# Patient Record
Sex: MALE | Race: WHITE | Employment: FULL TIME | URBAN - METROPOLITAN AREA
[De-identification: names, ages, dates, MRNs, and addresses within clinical notes are randomized per-mention and may not be internally consistent; named-entity substitution may affect disease eponyms.]

---

## 2020-11-13 ENCOUNTER — OFFICE VISIT (OUTPATIENT)
Dept: VASCULAR SURGERY | Age: 55
End: 2020-11-13
Payer: OTHER GOVERNMENT

## 2020-11-13 ENCOUNTER — TELEPHONE (OUTPATIENT)
Dept: VASCULAR SURGERY | Age: 55
End: 2020-11-13

## 2020-11-13 VITALS
DIASTOLIC BLOOD PRESSURE: 102 MMHG | BODY MASS INDEX: 29.77 KG/M2 | SYSTOLIC BLOOD PRESSURE: 150 MMHG | HEIGHT: 69 IN | WEIGHT: 201 LBS

## 2020-11-13 PROBLEM — I65.22 LEFT CAROTID STENOSIS: Status: ACTIVE | Noted: 2020-11-13

## 2020-11-13 PROBLEM — Z98.890 HISTORY OF RIGHT COMMON CAROTID ARTERY STENT PLACEMENT: Status: ACTIVE | Noted: 2020-11-13

## 2020-11-13 PROBLEM — Z95.828 HISTORY OF RIGHT COMMON CAROTID ARTERY STENT PLACEMENT: Status: ACTIVE | Noted: 2020-11-13

## 2020-11-13 PROCEDURE — 99244 OFF/OP CNSLTJ NEW/EST MOD 40: CPT | Performed by: SURGERY

## 2020-11-13 RX ORDER — CLOPIDOGREL BISULFATE 75 MG/1
75 TABLET ORAL DAILY
Status: ON HOLD | COMMUNITY
End: 2021-07-14 | Stop reason: HOSPADM

## 2020-11-13 RX ORDER — ASPIRIN 325 MG
325 TABLET ORAL DAILY
Status: ON HOLD | COMMUNITY
End: 2021-07-14 | Stop reason: HOSPADM

## 2020-11-13 RX ORDER — AMLODIPINE BESYLATE 10 MG/1
10 TABLET ORAL DAILY
COMMUNITY
End: 2021-11-09 | Stop reason: ALTCHOICE

## 2020-11-13 RX ORDER — LISINOPRIL 10 MG/1
10 TABLET ORAL DAILY
Status: ON HOLD | COMMUNITY
End: 2021-07-14 | Stop reason: HOSPADM

## 2020-11-13 SDOH — HEALTH STABILITY: MENTAL HEALTH: HOW OFTEN DO YOU HAVE A DRINK CONTAINING ALCOHOL?: MONTHLY OR LESS

## 2020-11-13 NOTE — TELEPHONE ENCOUNTER
Called patient regarding rescheduling apt. Still no disc.  Need to reschedule to next week in hopes can get disc.pamlr

## 2020-11-13 NOTE — PROGRESS NOTES
mg by mouth daily   Yes Historical Provider, MD        Allergies:  Penicillins      Social History:      Social History     Socioeconomic History    Marital status: Unknown     Spouse name: Not on file    Number of children: Not on file    Years of education: Not on file    Highest education level: Not on file   Occupational History    Not on file   Social Needs    Financial resource strain: Not on file    Food insecurity     Worry: Not on file     Inability: Not on file    Transportation needs     Medical: Not on file     Non-medical: Not on file   Tobacco Use    Smoking status: Current Every Day Smoker     Packs/day: 2.00     Years: 45.00     Pack years: 90.00     Types: Cigarettes    Smokeless tobacco: Never Used   Substance and Sexual Activity    Alcohol use: Yes     Frequency: Monthly or less    Drug use: Never    Sexual activity: Yes   Lifestyle    Physical activity     Days per week: Not on file     Minutes per session: Not on file    Stress: Not on file   Relationships    Social connections     Talks on phone: Not on file     Gets together: Not on file     Attends [employer]ism service: Not on file     Active member of club or organization: Not on file     Attends meetings of clubs or organizations: Not on file     Relationship status: Not on file    Intimate partner violence     Fear of current or ex partner: Not on file     Emotionally abused: Not on file     Physically abused: Not on file     Forced sexual activity: Not on file   Other Topics Concern    Not on file   Social History Narrative    Not on file       Family History:    History reviewed. No pertinent family history. Review of Systems:  A 14 point review of systems was completed. Pertinent positives identified in the HPI, all other review of systems negative.       Physical Examination:    BP (!) 150/102 (Site: Right Upper Arm)   Ht 5' 9\" (1.753 m)   Wt 201 lb (91.2 kg)   BMI 29.68 kg/m²     Weight: 201 lb (91.2 kg) General appearance: NAD  Eyes: PERRLA  Neck: no JVD, no lymphadenopathy. Respiratory: effort is unlabored, no crackles, wheezes or rubs. Cardiovascular: regular, no murmur. Left carotid bruits. No edema or varicosities. Pulses:    carotid brachial radial   RIGHT 2 2 2   LEFT 2 - -   GI: abdomen soft, nondistended, no organomegaly. Musculoskeletal: strength and tone normal.  Extremities: warm and pink. Skin: no dermatitis or ulceration. Neuro/psychiatric: grossly intact. MEDICAL DECISION MAKING/TESTING        CTA:  Personally reviewed. High grade Left Carotid stenosis. Patent R Carotid stent without stenosis. Carotid Bifurcation does not appear excessively high and stenosis/plaque rather focal            Assessment:      Diagnosis Orders   1. Left carotid stenosis     2. History of right common carotid artery stent placement     3. Left Subclavian stenosis/occlusion      Recommendations/Plan:  I have recommended Carotid endarterectomy as it is associated with less celio-procedural stroke risk and much less risk of recurrent stenosis. The risks of surgery have been explained including but not limited to stroke, TIA, MI, bleeding, clotting, death, cranial nerve injury(vagus,marginal branch of 7th nerve,hypoglossal )with their clinical appearance, hematoma, seroma, infection. Post operative expectations for hospitalization and recovery barring any complication discussed. An educational booklet on carotid surgery was provided.       Dl Huitron MD, FACS

## 2020-11-16 ENCOUNTER — HOSPITAL ENCOUNTER (OUTPATIENT)
Age: 55
Discharge: HOME | DRG: 039 | End: 2020-11-16
Payer: OTHER GOVERNMENT

## 2020-11-16 ENCOUNTER — PREP FOR PROCEDURE (OUTPATIENT)
Dept: VASCULAR SURGERY | Age: 55
End: 2020-11-16

## 2020-11-16 ENCOUNTER — OFFICE VISIT (OUTPATIENT)
Dept: PRIMARY CARE CLINIC | Age: 55
End: 2020-11-16
Payer: OTHER GOVERNMENT

## 2020-11-16 LAB
ANION GAP SERPL CALCULATED.3IONS-SCNC: 10 MMOL/L (ref 3–16)
BASOPHILS ABSOLUTE: 0 K/UL (ref 0–0.2)
BASOPHILS RELATIVE PERCENT: 0.5 %
BUN BLDV-MCNC: 18 MG/DL (ref 7–20)
CALCIUM SERPL-MCNC: 9.7 MG/DL (ref 8.3–10.6)
CHLORIDE BLD-SCNC: 103 MMOL/L (ref 99–110)
CO2: 24 MMOL/L (ref 21–32)
CREAT SERPL-MCNC: 1.3 MG/DL (ref 0.9–1.3)
EKG ATRIAL RATE: 62 BPM
EKG DIAGNOSIS: NORMAL
EKG P AXIS: 30 DEGREES
EKG P-R INTERVAL: 146 MS
EKG Q-T INTERVAL: 422 MS
EKG QRS DURATION: 94 MS
EKG QTC CALCULATION (BAZETT): 428 MS
EKG R AXIS: 60 DEGREES
EKG T AXIS: 73 DEGREES
EKG VENTRICULAR RATE: 62 BPM
EOSINOPHILS ABSOLUTE: 0.1 K/UL (ref 0–0.6)
EOSINOPHILS RELATIVE PERCENT: 1.2 %
GFR AFRICAN AMERICAN: >60
GFR NON-AFRICAN AMERICAN: 57
GLUCOSE BLD-MCNC: 156 MG/DL (ref 70–99)
HCT VFR BLD CALC: 50.4 % (ref 40.5–52.5)
HEMOGLOBIN: 16.8 G/DL (ref 13.5–17.5)
LYMPHOCYTES ABSOLUTE: 2.4 K/UL (ref 1–5.1)
LYMPHOCYTES RELATIVE PERCENT: 31.1 %
MCH RBC QN AUTO: 30.2 PG (ref 26–34)
MCHC RBC AUTO-ENTMCNC: 33.3 G/DL (ref 31–36)
MCV RBC AUTO: 90.8 FL (ref 80–100)
MONOCYTES ABSOLUTE: 0.5 K/UL (ref 0–1.3)
MONOCYTES RELATIVE PERCENT: 6.5 %
NEUTROPHILS ABSOLUTE: 4.8 K/UL (ref 1.7–7.7)
NEUTROPHILS RELATIVE PERCENT: 60.7 %
PDW BLD-RTO: 15 % (ref 12.4–15.4)
PLATELET # BLD: 303 K/UL (ref 135–450)
PMV BLD AUTO: 8.1 FL (ref 5–10.5)
POTASSIUM SERPL-SCNC: 5.4 MMOL/L (ref 3.5–5.1)
RBC # BLD: 5.55 M/UL (ref 4.2–5.9)
SODIUM BLD-SCNC: 137 MMOL/L (ref 136–145)
WBC # BLD: 7.8 K/UL (ref 4–11)

## 2020-11-16 PROCEDURE — 85025 COMPLETE CBC W/AUTO DIFF WBC: CPT

## 2020-11-16 PROCEDURE — 80048 BASIC METABOLIC PNL TOTAL CA: CPT

## 2020-11-16 PROCEDURE — 93005 ELECTROCARDIOGRAM TRACING: CPT | Performed by: SURGERY

## 2020-11-16 PROCEDURE — 93010 ELECTROCARDIOGRAM REPORT: CPT | Performed by: INTERNAL MEDICINE

## 2020-11-16 PROCEDURE — 36415 COLL VENOUS BLD VENIPUNCTURE: CPT

## 2020-11-16 PROCEDURE — 99211 OFF/OP EST MAY X REQ PHY/QHP: CPT | Performed by: NURSE PRACTITIONER

## 2020-11-16 NOTE — PROGRESS NOTES
Royal Gillis received a viral test for COVID-19. They were educated on isolation and quarantine as appropriate. For any symptoms, they were directed to seek care from their PCP, given contact information to establish with a doctor, directed to an urgent care or the emergency room.

## 2020-11-16 NOTE — PATIENT INSTRUCTIONS

## 2020-11-16 NOTE — PROGRESS NOTES
Obstructive Sleep Apnea (KADI) Screening     Patient:  Kirsten Ramsey    YOB: 1965      Medical Record #:  [id number]                     Date:  11/16/2020     1. Are you a loud and/or regular snorer? []  Yes       [x] No    2. Have you been observed to gasp or stop breathing during sleep? []  Yes       [x] No    3. Do you feel tired or groggy upon awakening or do you awaken with a headache?           []  Yes       [x] No    4. Are you often tired or fatigued during the wake time hours? []  Yes       [x] No    5. Do you fall asleep sitting, reading, watching TV or driving? []  Yes       [x] No    6. Do you often have problems with memory or concentration? []  Yes       [x] No    **If patient's score is ? 3 they are considered high risk for KADI. An Anesthesia provider will evaluate the patient and develop a plan of care the day of surgery. Note:  If the patient's BMI is more than 35 kg m¯² , has neck circumference > 40 cm, and/or high blood pressure the risk is greater (© American Sleep Apnea Association, 2006).

## 2020-11-18 ENCOUNTER — ANESTHESIA EVENT (OUTPATIENT)
Dept: OPERATING ROOM | Age: 55
DRG: 039 | End: 2020-11-18
Payer: OTHER GOVERNMENT

## 2020-11-18 LAB — SARS-COV-2: NOT DETECTED

## 2020-11-19 ENCOUNTER — HOSPITAL ENCOUNTER (INPATIENT)
Age: 55
LOS: 1 days | Discharge: HOME | DRG: 039 | End: 2020-11-20
Attending: SURGERY | Admitting: SURGERY
Payer: OTHER GOVERNMENT

## 2020-11-19 ENCOUNTER — ANESTHESIA (OUTPATIENT)
Dept: OPERATING ROOM | Age: 55
DRG: 039 | End: 2020-11-19
Payer: OTHER GOVERNMENT

## 2020-11-19 VITALS
TEMPERATURE: 96.1 F | RESPIRATION RATE: 1 BRPM | OXYGEN SATURATION: 93 % | SYSTOLIC BLOOD PRESSURE: 161 MMHG | DIASTOLIC BLOOD PRESSURE: 100 MMHG

## 2020-11-19 PROBLEM — I65.22 CAROTID STENOSIS, LEFT: Status: ACTIVE | Noted: 2020-11-19

## 2020-11-19 LAB
ABO/RH: NORMAL
ANION GAP SERPL CALCULATED.3IONS-SCNC: 8 MMOL/L (ref 3–16)
ANTIBODY SCREEN: NORMAL
BUN BLDV-MCNC: 15 MG/DL (ref 7–20)
CALCIUM SERPL-MCNC: 10.1 MG/DL (ref 8.3–10.6)
CHLORIDE BLD-SCNC: 101 MMOL/L (ref 99–110)
CO2: 29 MMOL/L (ref 21–32)
CREAT SERPL-MCNC: 1.1 MG/DL (ref 0.9–1.3)
GFR AFRICAN AMERICAN: >60
GFR NON-AFRICAN AMERICAN: >60
GLUCOSE BLD-MCNC: 109 MG/DL (ref 70–99)
POTASSIUM REFLEX MAGNESIUM: 4.5 MMOL/L (ref 3.5–5.1)
SODIUM BLD-SCNC: 138 MMOL/L (ref 136–145)

## 2020-11-19 PROCEDURE — 2580000003 HC RX 258: Performed by: SURGERY

## 2020-11-19 PROCEDURE — C1768 GRAFT, VASCULAR: HCPCS | Performed by: SURGERY

## 2020-11-19 PROCEDURE — 03CL0ZZ EXTIRPATION OF MATTER FROM LEFT INTERNAL CAROTID ARTERY, OPEN APPROACH: ICD-10-PCS | Performed by: SURGERY

## 2020-11-19 PROCEDURE — 2700000000 HC OXYGEN THERAPY PER DAY

## 2020-11-19 PROCEDURE — 86900 BLOOD TYPING SEROLOGIC ABO: CPT

## 2020-11-19 PROCEDURE — 2060000000 HC ICU INTERMEDIATE R&B

## 2020-11-19 PROCEDURE — 7100000001 HC PACU RECOVERY - ADDTL 15 MIN: Performed by: SURGERY

## 2020-11-19 PROCEDURE — 6360000002 HC RX W HCPCS: Performed by: ANESTHESIOLOGY

## 2020-11-19 PROCEDURE — 2709999900 HC NON-CHARGEABLE SUPPLY: Performed by: SURGERY

## 2020-11-19 PROCEDURE — 86850 RBC ANTIBODY SCREEN: CPT

## 2020-11-19 PROCEDURE — 3700000000 HC ANESTHESIA ATTENDED CARE: Performed by: SURGERY

## 2020-11-19 PROCEDURE — 6360000002 HC RX W HCPCS: Performed by: SURGERY

## 2020-11-19 PROCEDURE — 3600000007 HC SURGERY HYBRID BASE: Performed by: SURGERY

## 2020-11-19 PROCEDURE — 7100000000 HC PACU RECOVERY - FIRST 15 MIN: Performed by: SURGERY

## 2020-11-19 PROCEDURE — 2500000003 HC RX 250 WO HCPCS: Performed by: ANESTHESIOLOGY

## 2020-11-19 PROCEDURE — 3700000001 HC ADD 15 MINUTES (ANESTHESIA): Performed by: SURGERY

## 2020-11-19 PROCEDURE — 94761 N-INVAS EAR/PLS OXIMETRY MLT: CPT

## 2020-11-19 PROCEDURE — 2500000003 HC RX 250 WO HCPCS

## 2020-11-19 PROCEDURE — 2580000003 HC RX 258: Performed by: ANESTHESIOLOGY

## 2020-11-19 PROCEDURE — 86901 BLOOD TYPING SEROLOGIC RH(D): CPT

## 2020-11-19 PROCEDURE — 03UN0KZ SUPPLEMENT LEFT EXTERNAL CAROTID ARTERY WITH NONAUTOLOGOUS TISSUE SUBSTITUTE, OPEN APPROACH: ICD-10-PCS | Performed by: SURGERY

## 2020-11-19 PROCEDURE — 6370000000 HC RX 637 (ALT 250 FOR IP): Performed by: SURGERY

## 2020-11-19 PROCEDURE — 6360000002 HC RX W HCPCS

## 2020-11-19 PROCEDURE — 3600000017 HC SURGERY HYBRID ADDL 15MIN: Performed by: SURGERY

## 2020-11-19 PROCEDURE — 03CN0ZZ EXTIRPATION OF MATTER FROM LEFT EXTERNAL CAROTID ARTERY, OPEN APPROACH: ICD-10-PCS | Performed by: SURGERY

## 2020-11-19 PROCEDURE — 35301 RECHANNELING OF ARTERY: CPT | Performed by: SURGERY

## 2020-11-19 PROCEDURE — 2500000003 HC RX 250 WO HCPCS: Performed by: SURGERY

## 2020-11-19 PROCEDURE — 80048 BASIC METABOLIC PNL TOTAL CA: CPT

## 2020-11-19 PROCEDURE — 2580000003 HC RX 258

## 2020-11-19 PROCEDURE — 03UL0KZ SUPPLEMENT LEFT INTERNAL CAROTID ARTERY WITH NONAUTOLOGOUS TISSUE SUBSTITUTE, OPEN APPROACH: ICD-10-PCS | Performed by: SURGERY

## 2020-11-19 DEVICE — XENOSURE BIOLOGIC PATCH, 0.8CM X 8CM, EIFU
Type: IMPLANTABLE DEVICE | Site: NECK | Status: FUNCTIONAL
Brand: XENOSURE BIOLOGIC PATCH

## 2020-11-19 RX ORDER — ESMOLOL HYDROCHLORIDE 10 MG/ML
INJECTION INTRAVENOUS PRN
Status: DISCONTINUED | OUTPATIENT
Start: 2020-11-19 | End: 2020-11-19 | Stop reason: SDUPTHER

## 2020-11-19 RX ORDER — ONDANSETRON 2 MG/ML
4 INJECTION INTRAMUSCULAR; INTRAVENOUS
Status: DISCONTINUED | OUTPATIENT
Start: 2020-11-19 | End: 2020-11-19 | Stop reason: HOSPADM

## 2020-11-19 RX ORDER — OXYCODONE HYDROCHLORIDE AND ACETAMINOPHEN 5; 325 MG/1; MG/1
1 TABLET ORAL PRN
Status: DISCONTINUED | OUTPATIENT
Start: 2020-11-19 | End: 2020-11-19 | Stop reason: HOSPADM

## 2020-11-19 RX ORDER — CLOPIDOGREL BISULFATE 75 MG/1
75 TABLET ORAL DAILY
Status: DISCONTINUED | OUTPATIENT
Start: 2020-11-20 | End: 2020-11-20 | Stop reason: HOSPADM

## 2020-11-19 RX ORDER — SODIUM CHLORIDE 0.9 % (FLUSH) 0.9 %
10 SYRINGE (ML) INJECTION EVERY 12 HOURS SCHEDULED
Status: DISCONTINUED | OUTPATIENT
Start: 2020-11-19 | End: 2020-11-20 | Stop reason: HOSPADM

## 2020-11-19 RX ORDER — ASPIRIN 325 MG
325 TABLET ORAL DAILY
Status: DISCONTINUED | OUTPATIENT
Start: 2020-11-19 | End: 2020-11-20 | Stop reason: HOSPADM

## 2020-11-19 RX ORDER — SODIUM CHLORIDE 0.9 % (FLUSH) 0.9 %
10 SYRINGE (ML) INJECTION EVERY 12 HOURS SCHEDULED
Status: DISCONTINUED | OUTPATIENT
Start: 2020-11-19 | End: 2020-11-19 | Stop reason: HOSPADM

## 2020-11-19 RX ORDER — PROMETHAZINE HYDROCHLORIDE 25 MG/1
12.5 TABLET ORAL EVERY 6 HOURS PRN
Status: DISCONTINUED | OUTPATIENT
Start: 2020-11-19 | End: 2020-11-20 | Stop reason: HOSPADM

## 2020-11-19 RX ORDER — MORPHINE SULFATE 2 MG/ML
1 INJECTION, SOLUTION INTRAMUSCULAR; INTRAVENOUS EVERY 5 MIN PRN
Status: DISCONTINUED | OUTPATIENT
Start: 2020-11-19 | End: 2020-11-19 | Stop reason: HOSPADM

## 2020-11-19 RX ORDER — SODIUM CHLORIDE 9 MG/ML
INJECTION, SOLUTION INTRAVENOUS CONTINUOUS
Status: DISCONTINUED | OUTPATIENT
Start: 2020-11-19 | End: 2020-11-20

## 2020-11-19 RX ORDER — MORPHINE SULFATE 2 MG/ML
2 INJECTION, SOLUTION INTRAMUSCULAR; INTRAVENOUS
Status: DISCONTINUED | OUTPATIENT
Start: 2020-11-19 | End: 2020-11-20 | Stop reason: HOSPADM

## 2020-11-19 RX ORDER — SODIUM CHLORIDE 0.9 % (FLUSH) 0.9 %
10 SYRINGE (ML) INJECTION PRN
Status: DISCONTINUED | OUTPATIENT
Start: 2020-11-19 | End: 2020-11-20 | Stop reason: HOSPADM

## 2020-11-19 RX ORDER — SODIUM CHLORIDE 0.9 % (FLUSH) 0.9 %
10 SYRINGE (ML) INJECTION PRN
Status: DISCONTINUED | OUTPATIENT
Start: 2020-11-19 | End: 2020-11-19 | Stop reason: HOSPADM

## 2020-11-19 RX ORDER — HYDRALAZINE HYDROCHLORIDE 20 MG/ML
5 INJECTION INTRAMUSCULAR; INTRAVENOUS EVERY 10 MIN PRN
Status: DISCONTINUED | OUTPATIENT
Start: 2020-11-19 | End: 2020-11-19 | Stop reason: HOSPADM

## 2020-11-19 RX ORDER — ONDANSETRON 2 MG/ML
INJECTION INTRAMUSCULAR; INTRAVENOUS PRN
Status: DISCONTINUED | OUTPATIENT
Start: 2020-11-19 | End: 2020-11-19 | Stop reason: SDUPTHER

## 2020-11-19 RX ORDER — FENTANYL CITRATE 50 UG/ML
INJECTION, SOLUTION INTRAMUSCULAR; INTRAVENOUS PRN
Status: DISCONTINUED | OUTPATIENT
Start: 2020-11-19 | End: 2020-11-19 | Stop reason: SDUPTHER

## 2020-11-19 RX ORDER — ACETAMINOPHEN 325 MG/1
650 TABLET ORAL EVERY 4 HOURS PRN
Status: DISCONTINUED | OUTPATIENT
Start: 2020-11-19 | End: 2020-11-20 | Stop reason: HOSPADM

## 2020-11-19 RX ORDER — MEPERIDINE HYDROCHLORIDE 50 MG/ML
12.5 INJECTION INTRAMUSCULAR; INTRAVENOUS; SUBCUTANEOUS EVERY 5 MIN PRN
Status: DISCONTINUED | OUTPATIENT
Start: 2020-11-19 | End: 2020-11-19 | Stop reason: HOSPADM

## 2020-11-19 RX ORDER — LABETALOL HYDROCHLORIDE 5 MG/ML
5 INJECTION, SOLUTION INTRAVENOUS EVERY 10 MIN PRN
Status: DISCONTINUED | OUTPATIENT
Start: 2020-11-19 | End: 2020-11-19 | Stop reason: HOSPADM

## 2020-11-19 RX ORDER — OXYCODONE HYDROCHLORIDE AND ACETAMINOPHEN 5; 325 MG/1; MG/1
2 TABLET ORAL PRN
Status: DISCONTINUED | OUTPATIENT
Start: 2020-11-19 | End: 2020-11-19 | Stop reason: HOSPADM

## 2020-11-19 RX ORDER — MORPHINE SULFATE 2 MG/ML
2 INJECTION, SOLUTION INTRAMUSCULAR; INTRAVENOUS EVERY 5 MIN PRN
Status: DISCONTINUED | OUTPATIENT
Start: 2020-11-19 | End: 2020-11-19 | Stop reason: HOSPADM

## 2020-11-19 RX ORDER — PROMETHAZINE HYDROCHLORIDE 25 MG/ML
6.25 INJECTION, SOLUTION INTRAMUSCULAR; INTRAVENOUS
Status: DISCONTINUED | OUTPATIENT
Start: 2020-11-19 | End: 2020-11-19 | Stop reason: HOSPADM

## 2020-11-19 RX ORDER — PROPOFOL 10 MG/ML
INJECTION, EMULSION INTRAVENOUS PRN
Status: DISCONTINUED | OUTPATIENT
Start: 2020-11-19 | End: 2020-11-19 | Stop reason: SDUPTHER

## 2020-11-19 RX ORDER — AMLODIPINE BESYLATE 5 MG/1
10 TABLET ORAL DAILY
Status: DISCONTINUED | OUTPATIENT
Start: 2020-11-19 | End: 2020-11-20 | Stop reason: HOSPADM

## 2020-11-19 RX ORDER — DIPHENHYDRAMINE HYDROCHLORIDE 50 MG/ML
12.5 INJECTION INTRAMUSCULAR; INTRAVENOUS
Status: DISCONTINUED | OUTPATIENT
Start: 2020-11-19 | End: 2020-11-19 | Stop reason: HOSPADM

## 2020-11-19 RX ORDER — ONDANSETRON 2 MG/ML
4 INJECTION INTRAMUSCULAR; INTRAVENOUS EVERY 6 HOURS PRN
Status: DISCONTINUED | OUTPATIENT
Start: 2020-11-19 | End: 2020-11-20 | Stop reason: HOSPADM

## 2020-11-19 RX ORDER — LISINOPRIL 10 MG/1
10 TABLET ORAL DAILY
Status: DISCONTINUED | OUTPATIENT
Start: 2020-11-19 | End: 2020-11-20 | Stop reason: HOSPADM

## 2020-11-19 RX ORDER — ROCURONIUM BROMIDE 10 MG/ML
INJECTION, SOLUTION INTRAVENOUS PRN
Status: DISCONTINUED | OUTPATIENT
Start: 2020-11-19 | End: 2020-11-19 | Stop reason: SDUPTHER

## 2020-11-19 RX ORDER — SODIUM CHLORIDE, SODIUM LACTATE, POTASSIUM CHLORIDE, CALCIUM CHLORIDE 600; 310; 30; 20 MG/100ML; MG/100ML; MG/100ML; MG/100ML
INJECTION, SOLUTION INTRAVENOUS CONTINUOUS
Status: DISCONTINUED | OUTPATIENT
Start: 2020-11-19 | End: 2020-11-19

## 2020-11-19 RX ORDER — CLONIDINE HYDROCHLORIDE 0.1 MG/1
0.1 TABLET ORAL
Status: DISCONTINUED | OUTPATIENT
Start: 2020-11-19 | End: 2020-11-20 | Stop reason: HOSPADM

## 2020-11-19 RX ORDER — NITROGLYCERIN 20 MG/100ML
5 INJECTION INTRAVENOUS CONTINUOUS PRN
Status: DISCONTINUED | OUTPATIENT
Start: 2020-11-19 | End: 2020-11-20 | Stop reason: HOSPADM

## 2020-11-19 RX ORDER — MORPHINE SULFATE 4 MG/ML
4 INJECTION, SOLUTION INTRAMUSCULAR; INTRAVENOUS
Status: DISCONTINUED | OUTPATIENT
Start: 2020-11-19 | End: 2020-11-20 | Stop reason: HOSPADM

## 2020-11-19 RX ORDER — MIDAZOLAM HYDROCHLORIDE 1 MG/ML
INJECTION INTRAMUSCULAR; INTRAVENOUS PRN
Status: DISCONTINUED | OUTPATIENT
Start: 2020-11-19 | End: 2020-11-19 | Stop reason: SDUPTHER

## 2020-11-19 RX ORDER — HYDROCODONE BITARTRATE AND ACETAMINOPHEN 5; 325 MG/1; MG/1
1 TABLET ORAL EVERY 4 HOURS PRN
Status: DISCONTINUED | OUTPATIENT
Start: 2020-11-19 | End: 2020-11-20 | Stop reason: HOSPADM

## 2020-11-19 RX ORDER — LIDOCAINE HYDROCHLORIDE 20 MG/ML
INJECTION, SOLUTION INFILTRATION; PERINEURAL PRN
Status: DISCONTINUED | OUTPATIENT
Start: 2020-11-19 | End: 2020-11-19 | Stop reason: SDUPTHER

## 2020-11-19 RX ORDER — HYDROCODONE BITARTRATE AND ACETAMINOPHEN 5; 325 MG/1; MG/1
2 TABLET ORAL EVERY 4 HOURS PRN
Status: DISCONTINUED | OUTPATIENT
Start: 2020-11-19 | End: 2020-11-20 | Stop reason: HOSPADM

## 2020-11-19 RX ORDER — DEXAMETHASONE SODIUM PHOSPHATE 4 MG/ML
INJECTION, SOLUTION INTRA-ARTICULAR; INTRALESIONAL; INTRAMUSCULAR; INTRAVENOUS; SOFT TISSUE PRN
Status: DISCONTINUED | OUTPATIENT
Start: 2020-11-19 | End: 2020-11-19 | Stop reason: SDUPTHER

## 2020-11-19 RX ORDER — PROTAMINE SULFATE 10 MG/ML
INJECTION, SOLUTION INTRAVENOUS PRN
Status: DISCONTINUED | OUTPATIENT
Start: 2020-11-19 | End: 2020-11-19 | Stop reason: SDUPTHER

## 2020-11-19 RX ORDER — NITROGLYCERIN 5 MG/ML
INJECTION, SOLUTION INTRAVENOUS PRN
Status: DISCONTINUED | OUTPATIENT
Start: 2020-11-19 | End: 2020-11-19 | Stop reason: SDUPTHER

## 2020-11-19 RX ADMIN — CEFAZOLIN SODIUM 2 G: 10 INJECTION, POWDER, FOR SOLUTION INTRAVENOUS at 10:02

## 2020-11-19 RX ADMIN — HYDROMORPHONE HYDROCHLORIDE 0.5 MG: 1 INJECTION, SOLUTION INTRAMUSCULAR; INTRAVENOUS; SUBCUTANEOUS at 12:42

## 2020-11-19 RX ADMIN — PROPOFOL 20 MG: 10 INJECTION, EMULSION INTRAVENOUS at 11:33

## 2020-11-19 RX ADMIN — NITROGLYCERIN 80 MCG: 5 INJECTION, SOLUTION INTRAVENOUS at 11:54

## 2020-11-19 RX ADMIN — FENTANYL CITRATE 100 MCG: 50 INJECTION INTRAMUSCULAR; INTRAVENOUS at 10:17

## 2020-11-19 RX ADMIN — PROPOFOL 30 MG: 10 INJECTION, EMULSION INTRAVENOUS at 11:46

## 2020-11-19 RX ADMIN — NITROGLYCERIN 80 MCG: 5 INJECTION, SOLUTION INTRAVENOUS at 11:57

## 2020-11-19 RX ADMIN — FENTANYL CITRATE 100 MCG: 50 INJECTION INTRAMUSCULAR; INTRAVENOUS at 09:47

## 2020-11-19 RX ADMIN — PHENYLEPHRINE HYDROCHLORIDE 50 MCG/MIN: 10 INJECTION INTRAVENOUS at 10:00

## 2020-11-19 RX ADMIN — ESMOLOL HYDROCHLORIDE 20 MG: 10 INJECTION, SOLUTION INTRAVENOUS at 11:51

## 2020-11-19 RX ADMIN — NITROGLYCERIN 80 MCG: 5 INJECTION, SOLUTION INTRAVENOUS at 11:33

## 2020-11-19 RX ADMIN — ASPIRIN 325 MG ORAL TABLET 325 MG: 325 PILL ORAL at 16:11

## 2020-11-19 RX ADMIN — PROTAMINE SULFATE 25 MG: 10 INJECTION, SOLUTION INTRAVENOUS at 11:10

## 2020-11-19 RX ADMIN — FENTANYL CITRATE 50 MCG: 50 INJECTION INTRAMUSCULAR; INTRAVENOUS at 09:52

## 2020-11-19 RX ADMIN — NITROGLYCERIN 80 MCG: 5 INJECTION, SOLUTION INTRAVENOUS at 11:48

## 2020-11-19 RX ADMIN — NITROGLYCERIN 80 MCG: 5 INJECTION, SOLUTION INTRAVENOUS at 11:45

## 2020-11-19 RX ADMIN — FAMOTIDINE 20 MG: 10 INJECTION, SOLUTION INTRAVENOUS at 08:11

## 2020-11-19 RX ADMIN — SODIUM CHLORIDE, POTASSIUM CHLORIDE, SODIUM LACTATE AND CALCIUM CHLORIDE: 600; 310; 30; 20 INJECTION, SOLUTION INTRAVENOUS at 08:13

## 2020-11-19 RX ADMIN — HYDROMORPHONE HYDROCHLORIDE 0.5 MG: 1 INJECTION, SOLUTION INTRAMUSCULAR; INTRAVENOUS; SUBCUTANEOUS at 12:17

## 2020-11-19 RX ADMIN — ROCURONIUM BROMIDE 10 MG: 10 SOLUTION INTRAVENOUS at 10:57

## 2020-11-19 RX ADMIN — CLONIDINE HYDROCHLORIDE 0.1 MG: 0.1 TABLET ORAL at 20:14

## 2020-11-19 RX ADMIN — PROPOFOL 140 MG: 10 INJECTION, EMULSION INTRAVENOUS at 09:57

## 2020-11-19 RX ADMIN — LISINOPRIL 10 MG: 10 TABLET ORAL at 16:11

## 2020-11-19 RX ADMIN — ESMOLOL HYDROCHLORIDE 20 MG: 10 INJECTION, SOLUTION INTRAVENOUS at 11:55

## 2020-11-19 RX ADMIN — SODIUM CHLORIDE, POTASSIUM CHLORIDE, SODIUM LACTATE AND CALCIUM CHLORIDE: 600; 310; 30; 20 INJECTION, SOLUTION INTRAVENOUS at 11:45

## 2020-11-19 RX ADMIN — ONDANSETRON 4 MG: 2 INJECTION INTRAMUSCULAR; INTRAVENOUS at 09:47

## 2020-11-19 RX ADMIN — SODIUM CHLORIDE: 9 INJECTION, SOLUTION INTRAVENOUS at 16:08

## 2020-11-19 RX ADMIN — LIDOCAINE HYDROCHLORIDE 100 MG: 20 INJECTION, SOLUTION INFILTRATION; PERINEURAL at 11:31

## 2020-11-19 RX ADMIN — Medication 10 ML: at 20:14

## 2020-11-19 RX ADMIN — SUGAMMADEX 200 MG: 100 INJECTION, SOLUTION INTRAVENOUS at 11:29

## 2020-11-19 RX ADMIN — ROCURONIUM BROMIDE 50 MG: 10 SOLUTION INTRAVENOUS at 09:57

## 2020-11-19 RX ADMIN — NITROGLYCERIN 80 MCG: 5 INJECTION, SOLUTION INTRAVENOUS at 11:42

## 2020-11-19 RX ADMIN — NITROGLYCERIN 80 MCG: 5 INJECTION, SOLUTION INTRAVENOUS at 11:51

## 2020-11-19 RX ADMIN — AMLODIPINE BESYLATE 10 MG: 5 TABLET ORAL at 16:11

## 2020-11-19 RX ADMIN — FENTANYL CITRATE 100 MCG: 50 INJECTION INTRAMUSCULAR; INTRAVENOUS at 11:46

## 2020-11-19 RX ADMIN — HYDROMORPHONE HYDROCHLORIDE 0.5 MG: 1 INJECTION, SOLUTION INTRAMUSCULAR; INTRAVENOUS; SUBCUTANEOUS at 12:25

## 2020-11-19 RX ADMIN — LIDOCAINE HYDROCHLORIDE 100 MG: 20 INJECTION, SOLUTION INFILTRATION; PERINEURAL at 09:47

## 2020-11-19 RX ADMIN — MIDAZOLAM HYDROCHLORIDE 2 MG: 2 INJECTION, SOLUTION INTRAMUSCULAR; INTRAVENOUS at 09:30

## 2020-11-19 RX ADMIN — DEXAMETHASONE SODIUM PHOSPHATE 10 MG: 4 INJECTION, SOLUTION INTRAMUSCULAR; INTRAVENOUS at 10:09

## 2020-11-19 RX ADMIN — ESMOLOL HYDROCHLORIDE 20 MG: 10 INJECTION, SOLUTION INTRAVENOUS at 10:19

## 2020-11-19 RX ADMIN — NITROGLYCERIN 80 MCG: 5 INJECTION, SOLUTION INTRAVENOUS at 11:36

## 2020-11-19 RX ADMIN — NITROGLYCERIN 80 MCG: 5 INJECTION, SOLUTION INTRAVENOUS at 11:39

## 2020-11-19 ASSESSMENT — PULMONARY FUNCTION TESTS
PIF_VALUE: 0
PIF_VALUE: 18
PIF_VALUE: 18
PIF_VALUE: 8
PIF_VALUE: 1
PIF_VALUE: 19
PIF_VALUE: 18
PIF_VALUE: 36
PIF_VALUE: 18
PIF_VALUE: 18
PIF_VALUE: 19
PIF_VALUE: 12
PIF_VALUE: 18
PIF_VALUE: 18
PIF_VALUE: 2
PIF_VALUE: 19
PIF_VALUE: 18
PIF_VALUE: 24
PIF_VALUE: 18
PIF_VALUE: 23
PIF_VALUE: 18
PIF_VALUE: 0
PIF_VALUE: 0
PIF_VALUE: 19
PIF_VALUE: 20
PIF_VALUE: 14
PIF_VALUE: 36
PIF_VALUE: 1
PIF_VALUE: 0
PIF_VALUE: 18
PIF_VALUE: 18
PIF_VALUE: 0
PIF_VALUE: 36
PIF_VALUE: 0
PIF_VALUE: 18
PIF_VALUE: 0
PIF_VALUE: 18
PIF_VALUE: 19
PIF_VALUE: 23
PIF_VALUE: 14
PIF_VALUE: 18
PIF_VALUE: 1
PIF_VALUE: 18
PIF_VALUE: 0
PIF_VALUE: 18
PIF_VALUE: 19
PIF_VALUE: 14
PIF_VALUE: 18
PIF_VALUE: 1
PIF_VALUE: 0
PIF_VALUE: 14
PIF_VALUE: 18
PIF_VALUE: 0
PIF_VALUE: 18
PIF_VALUE: 18
PIF_VALUE: 20
PIF_VALUE: 18
PIF_VALUE: 18
PIF_VALUE: 0
PIF_VALUE: 18
PIF_VALUE: 20
PIF_VALUE: 2
PIF_VALUE: 18
PIF_VALUE: 18
PIF_VALUE: 26
PIF_VALUE: 18
PIF_VALUE: 20
PIF_VALUE: 28
PIF_VALUE: 18
PIF_VALUE: 18
PIF_VALUE: 0
PIF_VALUE: 18
PIF_VALUE: 19
PIF_VALUE: 18
PIF_VALUE: 18
PIF_VALUE: 0
PIF_VALUE: 18
PIF_VALUE: 0
PIF_VALUE: 18
PIF_VALUE: 0
PIF_VALUE: 19
PIF_VALUE: 18
PIF_VALUE: 16
PIF_VALUE: 19
PIF_VALUE: 0
PIF_VALUE: 22
PIF_VALUE: 18
PIF_VALUE: 20
PIF_VALUE: 26
PIF_VALUE: 18
PIF_VALUE: 14
PIF_VALUE: 18
PIF_VALUE: 0
PIF_VALUE: 18
PIF_VALUE: 0
PIF_VALUE: 19
PIF_VALUE: 29
PIF_VALUE: 32
PIF_VALUE: 18
PIF_VALUE: 21
PIF_VALUE: 18
PIF_VALUE: 1
PIF_VALUE: 2
PIF_VALUE: 18
PIF_VALUE: 0
PIF_VALUE: 18
PIF_VALUE: 20
PIF_VALUE: 18
PIF_VALUE: 14
PIF_VALUE: 18
PIF_VALUE: 18
PIF_VALUE: 1

## 2020-11-19 ASSESSMENT — PAIN DESCRIPTION - FREQUENCY
FREQUENCY: CONTINUOUS

## 2020-11-19 ASSESSMENT — PAIN DESCRIPTION - ORIENTATION
ORIENTATION: LEFT

## 2020-11-19 ASSESSMENT — PAIN DESCRIPTION - DESCRIPTORS
DESCRIPTORS: ACHING
DESCRIPTORS: SORE

## 2020-11-19 ASSESSMENT — PAIN DESCRIPTION - ONSET
ONSET: ON-GOING

## 2020-11-19 ASSESSMENT — PAIN DESCRIPTION - LOCATION
LOCATION: NECK

## 2020-11-19 ASSESSMENT — PAIN SCALES - GENERAL
PAINLEVEL_OUTOF10: 5
PAINLEVEL_OUTOF10: 7
PAINLEVEL_OUTOF10: 5

## 2020-11-19 ASSESSMENT — LIFESTYLE VARIABLES: SMOKING_STATUS: 1

## 2020-11-19 ASSESSMENT — PAIN DESCRIPTION - PAIN TYPE
TYPE: SURGICAL PAIN

## 2020-11-19 ASSESSMENT — PAIN - FUNCTIONAL ASSESSMENT: PAIN_FUNCTIONAL_ASSESSMENT: 0-10

## 2020-11-19 NOTE — OP NOTE
[facility]                 [facility]                                 OPERATIVE REPORT    PATIENT NAME: Rasheeda Pedroza                      :        1965  MED REC NO:   [id number]                          ROOM:  ACCOUNT NO:   [de-identified]                           ADMIT DATE: 2020  PROVIDER:     Mychal Hawley MD    DATE OF PROCEDURE:  2020    PREOPERATIVE DIAGNOSIS:  High-grade left carotid stenosis. POSTOPERATIVE DIAGNOSIS:  High-grade left carotid stenosis. PROCEDURE:  Left carotid endarterectomy. SURGEON:  Mychal Hawley MD    ANESTHESIA:  General endotracheal.    INDICATIONS:  The patient is a 13-year-old male with history of carotid  disease. He is status post right carotid stenting. He underwent  followup duplex examination showing high-grade left carotid stenosis. This was followed by CT angiography which showed greater than 90%  proximal left carotid stenosis. The patient is brought to the operating  room at this time to undergo endarterectomy. DESCRIPTION OF PROCEDURE  The patient was brought to the operating room  and placed in the supine position. General endotracheal anesthesia was  induced. After adequate anesthesia, the left neck was prepped and  draped in a sterile fashion. A small incision was made along the medial border of the  sternocleidomastoid muscle and carried down through the subcutaneous  tissues through the platysma. Common facial vein was ligated and  divided using 2-0 silk ties and 3-0 silk suture ligatures. The jugular  vein was then retracted laterally exposing the carotid bifurcation. The  common internal and external carotid arteries were dissected above and  below the bifurcation. The patient was given 5000 units of intravenous  heparin.   After approximately 3 minutes, the common carotid artery was  clamped and the vessel loops around the external carotid artery were  constricted. The distal common carotid artery was punctured with a 20-gauge needle  connected to a pressure line allowing measurement of the internal  carotid artery stump pressure. This had a mean value of approximately  50, therefore no shunt was used and the distal internal carotid artery  was clamped. A longitudinal arteriotomy was then made starting in the distal common  carotid artery and extended through the bulb and beyond the stenosis  into the normal distal internal carotid artery. Standard endarterectomy  was then performed tapering off the distal endpoints on the internal  carotid artery and performing an eversion endarterectomy on the external  branch. All loose intima was removed. The endarterectomy site was  flushed with heparinized saline solution and a patch closure performed  using a bovine pericardial patch and secured using running 6-0 Prolene  suture. Just prior to completion of the patch closure, the internal and  external carotid arteries were backbled. The common carotid artery was  flushed. Flow was then established first into the external carotid  artery, then the internal carotid artery. Intraoperative duplex examination was then performed showing no evidence  of intimal disease or stenosis. The distal internal carotid artery was  somewhat small and tortuous. The peak systolic velocity was 405 cm/sec  with end-diastolic velocity of 70, although 2-D imaging failed to show  any residual stenosis or intimal flaps. The elevated velocities may  very well be due to the small size and tortuosity. The patient's heparin was then reversed with 25 mg of intravenous  protamine. A piece of thrombin-soaked Gelfoam was placed over the top  of the patch and a 7-flat Abdiel-Kohler drain was placed into the  operative bed through an inferior stab incision.     The neck was then closed in multiple layers using running 2-0 Vicryl  suture, 3-0 Vicryl suture and then running 4-0 Monocryl subcuticular  sutures were used to reapproximate the skin edges. There were no complications to this procedure. Estimated blood loss was  less than 100 mL. At the end of the procedure, sponge, needle and  instrument counts were correct. The patient was extubated in the operating room and transferred to the  recovery area in a stable, awake, neurologically intact condition moving  all extremities.         Lilly Tamayo MD    D: 11/19/2020 12:40:56       T: 11/19/2020 12:48:48     [email or web address]  Job#: [id number]     Doc#: [id number]    CC:

## 2020-11-19 NOTE — ANESTHESIA PRE PROCEDURE
Department of Anesthesiology  Preprocedure Note       Name:  Pepper Feliciano   Age:  62 y.o.  :  1965                                          MRN:  [id number]         Date:  2020      Surgeon: Karissa Dos Santos):  Hailey Malave MD    Procedure: Procedure(s):  LEFT CAROTID ENDARTERECTOMY    Medications prior to admission:   Prior to Admission medications    Medication Sig Start Date End Date Taking? Authorizing Provider   amLODIPine (NORVASC) 10 MG tablet Take 10 mg by mouth daily   Yes Historical Provider, MD   clopidogrel (PLAVIX) 75 MG tablet Take 75 mg by mouth daily   Yes Historical Provider, MD   aspirin 325 MG tablet Take 325 mg by mouth daily 20 - pt states he takes 2 tablets daily   Yes Historical Provider, MD   lisinopril (PRINIVIL;ZESTRIL) 10 MG tablet Take 10 mg by mouth daily   Yes Historical Provider, MD       Current medications:    Current Facility-Administered Medications   Medication Dose Route Frequency Provider Last Rate Last Dose    lactated ringers infusion   Intravenous Continuous Florian Mixon  mL/hr at 20 0813      sodium chloride flush 0.9 % injection 10 mL  10 mL Intravenous 2 times per day Florian Mixon MD        sodium chloride flush 0.9 % injection 10 mL  10 mL Intravenous PRN Florian Mixon MD        ceFAZolin (ANCEF) 2 g in dextrose 5 % 100 mL IVPB  2 g Intravenous On Call to Coby Arias MD           Allergies:     Allergies   Allergen Reactions    Penicillins Rash       Problem List:    Patient Active Problem List   Diagnosis Code    Left carotid stenosis I65.22    History of right common carotid artery stent placement Z98.890, B39.302       Past Medical History:        Diagnosis Date    Atherosclerosis     Carotid stenosis     Heterozygous factor V Leiden mutation ([facility] [address]     History of TIAs     Hypertension        Past Surgical History:        Procedure Laterality Date    APPENDECTOMY      CAROTID ENDARTERECTOMY Right     VASCULAR SURGERY Bilateral     femoral       Social History:    Social History     Tobacco Use    Smoking status: Current Every Day Smoker     Packs/day: 2.00     Years: 45.00     Pack years: 90.00     Types: Cigarettes    Smokeless tobacco: Never Used   Substance Use Topics    Alcohol use: Yes     Frequency: Monthly or less     Comment: rare                                Ready to quit: Not Answered  Counseling given: Yes      Vital Signs (Current):   Vitals:    11/16/20 0914 11/19/20 0741   BP:  (!) 158/86   Pulse:  68   Resp:  15   Temp:  98.6 °F (37 °C)   TempSrc:  Temporal   SpO2:  97%   Weight: 201 lb (91.2 kg) 199 lb (90.3 kg)   Height: 5' 9\" (1.753 m) 5' 9\" (1.753 m)                                              BP Readings from Last 3 Encounters:   11/19/20 (!) 158/86   11/13/20 (!) 150/102       NPO Status: Time of last liquid consumption: 2100                        Time of last solid consumption: 2100                        Date of last liquid consumption: 11/18/20                        Date of last solid food consumption: 11/18/20    BMI:   Wt Readings from Last 3 Encounters:   11/19/20 199 lb (90.3 kg)   11/13/20 201 lb (91.2 kg)     Body mass index is 29.39 kg/m². CBC:   Lab Results   Component Value Date    WBC 7.8 11/16/2020    RBC 5.55 11/16/2020    HGB 16.8 11/16/2020    HCT 50.4 11/16/2020    MCV 90.8 11/16/2020    RDW 15.0 11/16/2020     11/16/2020       CMP:   Lab Results   Component Value Date     11/19/2020    K 4.5 11/19/2020     11/19/2020    CO2 29 11/19/2020    BUN 15 11/19/2020    CREATININE 1.1 11/19/2020    GFRAA >60 11/19/2020    LABGLOM >60 11/19/2020    GLUCOSE 109 11/19/2020    CALCIUM 10.1 11/19/2020       POC Tests: No results for input(s): POCGLU, POCNA, POCK, POCCL, POCBUN, POCHEMO, POCHCT in the last 72 hours.     Coags: No results found for: PROTIME, INR, APTT    HCG (If Applicable): No results found for: PREGTESTUR, PREGSERUM, HCG, HCGQUANT ABGs: No results found for: PHART, PO2ART, GGH0JOE, IJM3PBA, BEART, B7ODWHHR     Type & Screen (If Applicable):  No results found for: LABABO, LABRH    Drug/Infectious Status (If Applicable):  No results found for: HIV, HEPCAB    COVID-19 Screening (If Applicable):   Lab Results   Component Value Date    COVID19 Not Detected 11/16/2020         Anesthesia Evaluation   no history of anesthetic complications:   Airway: Mallampati: II  TM distance: >3 FB   Neck ROM: full  Mouth opening: > = 3 FB Dental: normal exam         Pulmonary:Negative Pulmonary ROS   (+) current smoker                           Cardiovascular:    (+) hypertension:,                   Neuro/Psych:   Negative Neuro/Psych ROS              GI/Hepatic/Renal: Neg GI/Hepatic/Renal ROS            Endo/Other: Negative Endo/Other ROS                    Abdominal:           Vascular:   + PVD, aortic or cerebral, . Anesthesia Plan      general     ASA 3     (Pt agrees to risks, benefits and alternatives of GETA. Questions answered. Willing to proceed with plan.)  Induction: intravenous. arterial line    Anesthetic plan and risks discussed with patient and spouse.                       Harrison Dixon MD   11/19/2020

## 2020-11-19 NOTE — ANESTHESIA PROCEDURE NOTES
Arterial Line:    An arterial line was placed using ultrasound guidance and surface landmarks, in the pre-op for the following indication(s): continuous blood pressure monitoring and blood sampling needed. A 20 gauge (size), 1 and 3/8 inch (length), Arrow (type) catheter was placed, Seldinger technique not used, into the right radial artery, secured by tape. Anesthesia type: Local    Events:  patient tolerated procedure well with no complications and EBL 0mL.   Anesthesiologist: Nnamdi Rendon MD  Preanesthetic Checklist  Completed: patient identified, site marked, surgical consent, pre-op evaluation, timeout performed, IV checked, risks and benefits discussed, monitors and equipment checked, anesthesia consent given, oxygen available and patient being monitored

## 2020-11-19 NOTE — BRIEF OP NOTE
Brief Postoperative Note      Patient: Lou Ascencio  YOB: 1965  MRN: [id number]    Date of Procedure: 11/19/2020    Pre-Op Diagnosis: LEFT CAROTID ARTERY STENOSIS    Post-Op Diagnosis: Same       Procedure(s):  LEFT CAROTID ENDARTERECTOMY    Surgeon(s):  Lotus Denise MD    Assistant:  Surgical Assistant: Jhon Hernandez    Anesthesia: General    Estimated Blood Loss (mL): less than 673     Complications: None    Specimens:   * No specimens in log *    Implants:  Implant Name Type Inv.  Item Serial No.  Lot No. LRB No. Used Action   GRAFT VASC W0.8XL8CM THK0.35-0.75MM CAR PERICARD PROC BOV  GRAFT VASC W0.8XL8CM THK0.35-0.75MM CAR PERICARD PROC BOV  [facility] VASCULAR [facility]- [id number] Left 1 Implanted         Drains:   Closed/Suction Drain Left Neck Bulb 7 [facility] (Active)           Electronically signed by Lotus Denise MD on 11/19/2020 at 11:28 AM

## 2020-11-19 NOTE — PROGRESS NOTES
D: Pt brought to PACU. Report obtained from [address] and CRNA. Pt placed on monitor and O2,  Frequent nonproductive coughing, denies pain or nausea.

## 2020-11-19 NOTE — ANESTHESIA POSTPROCEDURE EVALUATION
Department of Anesthesiology  Postprocedure Note    Patient: Harjeet Bee  MRN: [id number]  YOB: 1965  Date of evaluation: 11/19/2020  Time:  3:19 PM     Procedure Summary     Date:  11/19/20 Room / Location:  [place] [address] ([facility]    Anesthesia Start:  6184 Anesthesia Stop:  1678    Procedure:  LEFT CAROTID ENDARTERECTOMY (Left Neck) Diagnosis:       Carotid stenosis, left      (LEFT CAROTID ARTERY STENOSIS)    Surgeon: Nely Fields MD Responsible Provider:  Aruna Guidry MD    Anesthesia Type:  general ASA Status:  3          Anesthesia Type: general    Faisal Phase I: Faisal Score: 9    Faisal Phase II:      Last vitals: Reviewed and per EMR flowsheets. Anesthesia Post Evaluation    Patient location during evaluation: PACU  Patient participation: complete - patient participated  Level of consciousness: awake and alert  Airway patency: patent  Nausea & Vomiting: no nausea and no vomiting  Complications: no  Cardiovascular status: blood pressure returned to baseline  Respiratory status: acceptable  Hydration status: euvolemic  Comments: VSS on transfer to phase 2 recovery. No anesthetic complications.

## 2020-11-19 NOTE — PROGRESS NOTES
Vitals:    11/19/20 1400   BP: (!) 155/93   Pulse: 68   Resp: 18   Temp: 97.3 °F (36.3 °C)   SpO2: 95%     Pt resting quietly in bed alert and oriented. Pt had successful carotid endartectomy. SONYA drain in place. Surgical site clean/dry/intact. Pt denies having pain at this time and denies having further needs. Bed locked in lowest position, call light within reach, bedside table within reach.  Will continue to monitor

## 2020-11-19 NOTE — H&P
I have reviewed the history and physical (See note dated 11/13/2020) and examined the patient and find no relevant changes. I have reviewed with the patient and/or family the risks, benefits, and alternatives to the procedure.

## 2020-11-20 VITALS
WEIGHT: 197.6 LBS | RESPIRATION RATE: 20 BRPM | HEART RATE: 72 BPM | OXYGEN SATURATION: 92 % | HEIGHT: 69 IN | BODY MASS INDEX: 29.27 KG/M2 | SYSTOLIC BLOOD PRESSURE: 142 MMHG | TEMPERATURE: 98.3 F | DIASTOLIC BLOOD PRESSURE: 83 MMHG

## 2020-11-20 LAB
HCT VFR BLD CALC: 44.9 % (ref 40.5–52.5)
HEMOGLOBIN: 15.2 G/DL (ref 13.5–17.5)
MCH RBC QN AUTO: 30.2 PG (ref 26–34)
MCHC RBC AUTO-ENTMCNC: 33.8 G/DL (ref 31–36)
MCV RBC AUTO: 89.4 FL (ref 80–100)
PDW BLD-RTO: 14.9 % (ref 12.4–15.4)
PLATELET # BLD: 323 K/UL (ref 135–450)
PMV BLD AUTO: 8 FL (ref 5–10.5)
RBC # BLD: 5.02 M/UL (ref 4.2–5.9)
WBC # BLD: 16 K/UL (ref 4–11)

## 2020-11-20 PROCEDURE — 6370000000 HC RX 637 (ALT 250 FOR IP): Performed by: SURGERY

## 2020-11-20 PROCEDURE — 2580000003 HC RX 258: Performed by: SURGERY

## 2020-11-20 PROCEDURE — 36415 COLL VENOUS BLD VENIPUNCTURE: CPT

## 2020-11-20 PROCEDURE — 85027 COMPLETE CBC AUTOMATED: CPT

## 2020-11-20 RX ADMIN — ASPIRIN 325 MG ORAL TABLET 325 MG: 325 PILL ORAL at 09:40

## 2020-11-20 RX ADMIN — AMLODIPINE BESYLATE 10 MG: 5 TABLET ORAL at 09:44

## 2020-11-20 RX ADMIN — SODIUM CHLORIDE: 9 INJECTION, SOLUTION INTRAVENOUS at 05:23

## 2020-11-20 RX ADMIN — CLOPIDOGREL BISULFATE 75 MG: 75 TABLET ORAL at 09:41

## 2020-11-20 RX ADMIN — LISINOPRIL 10 MG: 10 TABLET ORAL at 09:40

## 2020-11-20 NOTE — CARE COORDINATION
Discharge order noted. Spoke with patient and wife at bedside. They state that patient is independent at home. Denies any DME or services at home. Deny any needs from CM.

## 2020-11-20 NOTE — FLOWSHEET NOTE
11/19/20 2151   Assessment   Charting Type Shift assessment   Neurological   Level of Consciousness 0   Orientation Level Oriented X4   Cognition Appropriate judgement; Appropriate safety awareness   Language Clear   Size R Pupil (mm) 3   R Pupil Shape Round   R Pupil Reaction Brisk   Size L Pupil (mm) 3   L Pupil Shape Round   L Pupil Reaction Brisk   R Hand  Strong   L Hand  Strong   R Foot Dorsiflexion Strong   L Foot Dorsiflexion Strong   R Foot Plantar Flexion Strong   L Foot Plantar Flexion Strong   RUE Motor Response Responds to command   LUE Motor Response Responds to command   RLE Motor Response Responds to command   LLE Motor Response Responds to command   [place] Coma Scale   Eye Opening 4   Best Verbal Response 5   Best Motor Response 6   [place] Coma Scale Score 15   HEENT   HEENT (WDL) WDL  (wears glasses)   Respiratory   Respiratory (WDL) X   Respiratory Pattern Regular   Respiratory Depth Normal   Respiratory Quality/Effort Unlabored   Chest Assessment Chest expansion symmetrical   L Breath Sounds Diminished   R Breath Sounds Diminished   Cough/Sputum   Cough Non-productive   Cardiac   Cardiac Regularity Regular   Heart Sounds S1, S2   Cardiac Rhythm NSR   Cardiac Monitor   Bedside Cardiac Monitor On Yes  (PACU monitor)   Bedside Cardiac Audible Yes   Bedside Cardiac Alarms Set Yes   Gastrointestinal   Abdominal (WDL) WDL  (soft, nontender, denies nausea)   Peripheral Vascular   Peripheral Vascular (WDL) WDL   Sensation RUE Full sensation   Sensation LUE Full sensation   Sensation RLE Full sensation   Sensation LLE Full sensation   Skin Color/Condition   Skin Color/Condition (WDL) WDL   Skin Integrity   Skin Integrity (WDL) WDL   Musculoskeletal   Musculoskeletal (WDL) WDL  (GILBERT x4, equaly strong on all extremities)   Genitourinary   Genitourinary (WDL)   (has not voided postop)   Anus/Rectum   Anus/Rectum (WDL) WDL   Incision 11/19/20 Neck Left   Date First Assessed: 11/19/20   Present on Hospital Admission: No  Primary Wound Type: Incision  Location: Neck  Wound Location Orientation: Left   Dressing Status Clean;Dry; Intact   Dressing/Treatment Open to air   Closure Surgical glue   Drainage Amount None   Psychosocial   Psychosocial (WDL) WDL

## 2020-11-20 NOTE — PROGRESS NOTES
Vascular Surgery Progress Note      SUBJECTIVE:  No c/o this am.  Denies pain    OBJECTIVE    Physical  CURRENT VITALS:  /83   Pulse 67   Temp 97.8 °F (36.6 °C) (Oral)   Resp 18   Ht 5' 9\" (1.753 m)   Wt 197 lb 9.6 oz (89.6 kg)   SpO2 95%   BMI 29.18 kg/m²   24 HR INTAKE/OUTPUT:    Intake/Output Summary (Last 24 hours) at 11/20/2020 0804  Last data filed at 11/20/2020 0522  Gross per 24 hour   Intake 4652 ml   Output 945 ml   Net 3707 ml     Incision intact  No hematoma  Tongue midline  Neuro grossly normal    Data  CBC:   Lab Results   Component Value Date    WBC 16.0 11/20/2020    RBC 5.02 11/20/2020    HGB 15.2 11/20/2020    HCT 44.9 11/20/2020    MCV 89.4 11/20/2020    MCH 30.2 11/20/2020    MCHC 33.8 11/20/2020    RDW 14.9 11/20/2020     11/20/2020    MPV 8.0 11/20/2020         ASSESSMENT AND PLAN    POD #1 L CEA   Neuro intact   SONYA removed   Home later this am.    Denies need for pain meds.

## 2020-11-20 NOTE — PROGRESS NOTES
Discharge instructions reviewed, no new prescriptions, IV and tele monitor removed. Patient in stable condition, discharged at this time with wife.  Pt.ambulatory to car

## 2020-11-20 NOTE — DISCHARGE INSTR - COC
Continuity of Care Form    Patient Name: Shani Alvarenga   :  1965  MRN:  [id number]    Admit date:  2020  Discharge date:  ***    Code Status Order: Full Code   Advance Directives:   Advance Care Flowsheet Documentation     Date/Time Healthcare Directive Type of Healthcare Directive Copy in [address] Agent's Name Healthcare Agent's Phone Number    20 1543  No, patient does not have an advance directive for healthcare treatment  --  --  --  -- --    20  Yes, patient has an advance directive for healthcare treatment  --  Yes, copy in chart  --  -- --    20 0942  Yes, patient has an advance directive for healthcare treatment  Living will;Durable power of  for health care  --  [address] [place] [address] --          Admitting Physician: Debora Bradford MD  PCP: No primary care provider on file. Discharging Nurse: [facility] Unit/Room#: 6475/2486-60  Discharging Unit Phone Number: ***    Emergency Contact:   Extended Emergency Contact Information  Primary Emergency Contact: [address] [place] Gualberto Eduar Mery Phone: [phone]  Relation: Spouse    Past Surgical History:  Past Surgical History:   Procedure Laterality Date    APPENDECTOMY      CAROTID ENDARTERECTOMY Right     VASCULAR SURGERY Bilateral     femoral       Immunization History: There is no immunization history on file for this patient.     Active Problems:  Patient Active Problem List   Diagnosis Code    Stenosis of left carotid artery I65.22    History of right common carotid artery stent placement Z98.890, Z95.828    Carotid stenosis, left I65.22       Isolation/Infection:   Isolation          No Isolation        Patient Infection Status     None to display          Nurse Assessment:  Last Vital Signs: BP (!) 142/83   Pulse 72   Temp 98.3 °F (36.8 °C) (Oral)   Resp 20   Ht 5' 9\" (1.753 m)   Wt 197 lb 9.6 oz (89.6 kg)   SpO2 92%   BMI 29.18 kg/m²     Last documented pain score ***    Patient's personal belongings (please select all that are sent with patient):  {CHP DME Belongings:749343313}    RN SIGNATURE:  {Esignature:193691709}    CASE MANAGEMENT/SOCIAL WORK SECTION    Inpatient Status Date: ***    Readmission Risk Assessment Score:  Readmission Risk              Risk of Unplanned Readmission:        6           Discharging to Facility/ Agency   · Name:   · Address:  · Phone:  · Fax:    Dialysis Facility (if applicable)   · Name:  · Address:  · Dialysis Schedule:  · Phone:  · Fax:    / signature: {Esignature:730453555}    PHYSICIAN SECTION    Prognosis: {Prognosis:3384272677}    Condition at Discharge: [address] [place] [address] Patient Condition:243730972}    Rehab Potential (if transferring to Rehab): {Prognosis:196324}    Recommended Labs or Other Treatments After Discharge: ***    Physician Certification: I certify the above information and transfer of Jacob Riddle  is necessary for the continuing treatment of the diagnosis listed and that he requires {Admit to Appropriate Level of Care:39984} for {GREATER/LESS:240478690} 30 days.      Update Admission H&P: {CHP DME Changes in VDMNQ:363421638}    PHYSICIAN SIGNATURE:  {Esignature:556169197}

## 2020-11-21 NOTE — DISCHARGE SUMMARY
Physician Discharge Summary     Patient ID:  Shani Alvarenga  [id number]  88 y.o.  1965    Admit date: 11/19/2020    Discharge date and time: 11/20/2020 11:01 AM     Admitting Physician: Debora Bradford MD     Discharge Physician: same    Admission Diagnoses: Carotid stenosis, left [I65.22]  Carotid stenosis, left [I65.22]    Discharge Diagnoses: same    Admission Condition: good    Discharged Condition: good    Indication for Admission: Admitted to undergo L CEA. Hospital Course: Admitted, taken to OR where above performed. Post-op course routine and unremarkable. Disposition: home    In process/preliminary results:  Outstanding Order Results     No orders found from 10/21/2020 to 11/20/2020. Patient Instructions:   Discharge Medication List as of 11/20/2020 10:50 AM      CONTINUE these medications which have NOT CHANGED    Details   amLODIPine (NORVASC) 10 MG tablet Take 10 mg by mouth dailyHistorical Med      clopidogrel (PLAVIX) 75 MG tablet Take 75 mg by mouth dailyHistorical Med      aspirin 325 MG tablet Take 325 mg by mouth daily 11/16/20 - pt states he takes 2 tablets dailyHistorical Med      lisinopril (PRINIVIL;ZESTRIL) 10 MG tablet Take 10 mg by mouth dailyHistorical Med           Activity: activity as tolerated  Diet: regular diet  Wound Care: keep wound clean and dry    Follow-up with Dr Michi Beverly in 2 weeks. Signed:   Debora Bradford MD  11/21/2020  8:36 AM

## 2021-07-13 ENCOUNTER — APPOINTMENT (OUTPATIENT)
Dept: GENERAL RADIOLOGY | Age: 56
DRG: 247 | End: 2021-07-13
Payer: OTHER GOVERNMENT

## 2021-07-13 ENCOUNTER — HOSPITAL ENCOUNTER (INPATIENT)
Age: 56
LOS: 1 days | Discharge: HOME | DRG: 247 | End: 2021-07-14
Attending: EMERGENCY MEDICINE | Admitting: FAMILY MEDICINE
Payer: OTHER GOVERNMENT

## 2021-07-13 ENCOUNTER — TELEPHONE (OUTPATIENT)
Dept: CARDIOLOGY | Age: 56
End: 2021-07-13

## 2021-07-13 DIAGNOSIS — I21.4 NSTEMI (NON-ST ELEVATED MYOCARDIAL INFARCTION) (HCC): Primary | ICD-10-CM

## 2021-07-13 LAB
A/G RATIO: 1.2 (ref 1.1–2.2)
ALBUMIN SERPL-MCNC: 4 G/DL (ref 3.4–5)
ALP BLD-CCNC: 70 U/L (ref 40–129)
ALT SERPL-CCNC: 26 U/L (ref 10–40)
ANION GAP SERPL CALCULATED.3IONS-SCNC: 13 MMOL/L (ref 3–16)
ANION GAP SERPL CALCULATED.3IONS-SCNC: 9 MMOL/L (ref 3–16)
APTT: 101.8 SEC (ref 26.2–38.6)
APTT: 33.7 SEC (ref 26.2–38.6)
AST SERPL-CCNC: 20 U/L (ref 15–37)
BASOPHILS ABSOLUTE: 0.1 K/UL (ref 0–0.2)
BASOPHILS RELATIVE PERCENT: 1.1 %
BILIRUB SERPL-MCNC: <0.2 MG/DL (ref 0–1)
BUN BLDV-MCNC: 20 MG/DL (ref 7–20)
BUN BLDV-MCNC: 21 MG/DL (ref 7–20)
CALCIUM SERPL-MCNC: 9.2 MG/DL (ref 8.3–10.6)
CALCIUM SERPL-MCNC: 9.4 MG/DL (ref 8.3–10.6)
CHLORIDE BLD-SCNC: 104 MMOL/L (ref 99–110)
CHLORIDE BLD-SCNC: 99 MMOL/L (ref 99–110)
CO2: 22 MMOL/L (ref 21–32)
CO2: 23 MMOL/L (ref 21–32)
CREAT SERPL-MCNC: 1.3 MG/DL (ref 0.9–1.3)
CREAT SERPL-MCNC: 1.4 MG/DL (ref 0.9–1.3)
EKG ATRIAL RATE: 62 BPM
EKG ATRIAL RATE: 72 BPM
EKG DIAGNOSIS: NORMAL
EKG DIAGNOSIS: NORMAL
EKG P AXIS: 57 DEGREES
EKG P AXIS: 62 DEGREES
EKG P-R INTERVAL: 156 MS
EKG P-R INTERVAL: 158 MS
EKG Q-T INTERVAL: 400 MS
EKG Q-T INTERVAL: 472 MS
EKG QRS DURATION: 88 MS
EKG QRS DURATION: 94 MS
EKG QTC CALCULATION (BAZETT): 438 MS
EKG QTC CALCULATION (BAZETT): 479 MS
EKG R AXIS: 61 DEGREES
EKG R AXIS: 69 DEGREES
EKG T AXIS: -29 DEGREES
EKG T AXIS: 47 DEGREES
EKG VENTRICULAR RATE: 62 BPM
EKG VENTRICULAR RATE: 72 BPM
EOSINOPHILS ABSOLUTE: 0.2 K/UL (ref 0–0.6)
EOSINOPHILS RELATIVE PERCENT: 2.4 %
GFR AFRICAN AMERICAN: >60
GFR AFRICAN AMERICAN: >60
GFR NON-AFRICAN AMERICAN: 52
GFR NON-AFRICAN AMERICAN: 57
GLOBULIN: 3.4 G/DL
GLUCOSE BLD-MCNC: 117 MG/DL (ref 70–99)
GLUCOSE BLD-MCNC: 120 MG/DL (ref 70–99)
HCT VFR BLD CALC: 47.5 % (ref 40.5–52.5)
HEMOGLOBIN: 16.3 G/DL (ref 13.5–17.5)
LV EF: 43 %
LVEF MODALITY: NORMAL
LYMPHOCYTES ABSOLUTE: 3.2 K/UL (ref 1–5.1)
LYMPHOCYTES RELATIVE PERCENT: 34.3 %
MCH RBC QN AUTO: 30.1 PG (ref 26–34)
MCHC RBC AUTO-ENTMCNC: 34.4 G/DL (ref 31–36)
MCV RBC AUTO: 87.3 FL (ref 80–100)
MONOCYTES ABSOLUTE: 0.7 K/UL (ref 0–1.3)
MONOCYTES RELATIVE PERCENT: 7.4 %
NEUTROPHILS ABSOLUTE: 5.1 K/UL (ref 1.7–7.7)
NEUTROPHILS RELATIVE PERCENT: 54.8 %
PDW BLD-RTO: 15.4 % (ref 12.4–15.4)
PLATELET # BLD: 281 K/UL (ref 135–450)
PMV BLD AUTO: 7.3 FL (ref 5–10.5)
POTASSIUM REFLEX MAGNESIUM: 4.4 MMOL/L (ref 3.5–5.1)
POTASSIUM SERPL-SCNC: 4.2 MMOL/L (ref 3.5–5.1)
RBC # BLD: 5.44 M/UL (ref 4.2–5.9)
SODIUM BLD-SCNC: 134 MMOL/L (ref 136–145)
SODIUM BLD-SCNC: 136 MMOL/L (ref 136–145)
TOTAL PROTEIN: 7.4 G/DL (ref 6.4–8.2)
TROPONIN: 0.06 NG/ML
TROPONIN: 0.09 NG/ML
TROPONIN: 0.13 NG/ML
WBC # BLD: 9.2 K/UL (ref 4–11)

## 2021-07-13 PROCEDURE — 2060000000 HC ICU INTERMEDIATE R&B

## 2021-07-13 PROCEDURE — 36415 COLL VENOUS BLD VENIPUNCTURE: CPT

## 2021-07-13 PROCEDURE — C1874 STENT, COATED/COV W/DEL SYS: HCPCS

## 2021-07-13 PROCEDURE — 6370000000 HC RX 637 (ALT 250 FOR IP): Performed by: INTERNAL MEDICINE

## 2021-07-13 PROCEDURE — 85730 THROMBOPLASTIN TIME PARTIAL: CPT

## 2021-07-13 PROCEDURE — C1887 CATHETER, GUIDING: HCPCS

## 2021-07-13 PROCEDURE — 4A023N7 MEASUREMENT OF CARDIAC SAMPLING AND PRESSURE, LEFT HEART, PERCUTANEOUS APPROACH: ICD-10-PCS | Performed by: INTERNAL MEDICINE

## 2021-07-13 PROCEDURE — C1725 CATH, TRANSLUMIN NON-LASER: HCPCS

## 2021-07-13 PROCEDURE — 6360000004 HC RX CONTRAST MEDICATION

## 2021-07-13 PROCEDURE — B2111ZZ FLUOROSCOPY OF MULTIPLE CORONARY ARTERIES USING LOW OSMOLAR CONTRAST: ICD-10-PCS | Performed by: INTERNAL MEDICINE

## 2021-07-13 PROCEDURE — 92928 PRQ TCAT PLMT NTRAC ST 1 LES: CPT

## 2021-07-13 PROCEDURE — 027035Z DILATION OF CORONARY ARTERY, ONE ARTERY WITH TWO DRUG-ELUTING INTRALUMINAL DEVICES, PERCUTANEOUS APPROACH: ICD-10-PCS | Performed by: INTERNAL MEDICINE

## 2021-07-13 PROCEDURE — 84484 ASSAY OF TROPONIN QUANT: CPT

## 2021-07-13 PROCEDURE — 93010 ELECTROCARDIOGRAM REPORT: CPT | Performed by: INTERNAL MEDICINE

## 2021-07-13 PROCEDURE — 93458 L HRT ARTERY/VENTRICLE ANGIO: CPT | Performed by: INTERNAL MEDICINE

## 2021-07-13 PROCEDURE — C1769 GUIDE WIRE: HCPCS

## 2021-07-13 PROCEDURE — 85025 COMPLETE CBC W/AUTO DIFF WBC: CPT

## 2021-07-13 PROCEDURE — 99255 IP/OBS CONSLTJ NEW/EST HI 80: CPT | Performed by: INTERNAL MEDICINE

## 2021-07-13 PROCEDURE — 93005 ELECTROCARDIOGRAM TRACING: CPT

## 2021-07-13 PROCEDURE — 93306 TTE W/DOPPLER COMPLETE: CPT

## 2021-07-13 PROCEDURE — 93005 ELECTROCARDIOGRAM TRACING: CPT | Performed by: INTERNAL MEDICINE

## 2021-07-13 PROCEDURE — C1894 INTRO/SHEATH, NON-LASER: HCPCS

## 2021-07-13 PROCEDURE — 6360000002 HC RX W HCPCS: Performed by: EMERGENCY MEDICINE

## 2021-07-13 PROCEDURE — 85347 COAGULATION TIME ACTIVATED: CPT

## 2021-07-13 PROCEDURE — 96365 THER/PROPH/DIAG IV INF INIT: CPT

## 2021-07-13 PROCEDURE — 99284 EMERGENCY DEPT VISIT MOD MDM: CPT

## 2021-07-13 PROCEDURE — 6370000000 HC RX 637 (ALT 250 FOR IP): Performed by: EMERGENCY MEDICINE

## 2021-07-13 PROCEDURE — 6360000002 HC RX W HCPCS

## 2021-07-13 PROCEDURE — 93458 L HRT ARTERY/VENTRICLE ANGIO: CPT

## 2021-07-13 PROCEDURE — 92928 PRQ TCAT PLMT NTRAC ST 1 LES: CPT | Performed by: INTERNAL MEDICINE

## 2021-07-13 PROCEDURE — 80053 COMPREHEN METABOLIC PANEL: CPT

## 2021-07-13 PROCEDURE — 6370000000 HC RX 637 (ALT 250 FOR IP)

## 2021-07-13 PROCEDURE — 71045 X-RAY EXAM CHEST 1 VIEW: CPT

## 2021-07-13 PROCEDURE — 2709999900 HC NON-CHARGEABLE SUPPLY

## 2021-07-13 PROCEDURE — 2500000003 HC RX 250 WO HCPCS

## 2021-07-13 PROCEDURE — 6360000002 HC RX W HCPCS: Performed by: FAMILY MEDICINE

## 2021-07-13 RX ORDER — ACETAMINOPHEN 325 MG/1
650 TABLET ORAL EVERY 6 HOURS PRN
Status: DISCONTINUED | OUTPATIENT
Start: 2021-07-13 | End: 2021-07-14 | Stop reason: HOSPADM

## 2021-07-13 RX ORDER — LISINOPRIL 10 MG/1
10 TABLET ORAL DAILY
Status: DISCONTINUED | OUTPATIENT
Start: 2021-07-13 | End: 2021-07-13

## 2021-07-13 RX ORDER — HEPARIN SODIUM 1000 [USP'U]/ML
INJECTION, SOLUTION INTRAVENOUS; SUBCUTANEOUS
Status: COMPLETED | OUTPATIENT
Start: 2021-07-13 | End: 2021-07-13

## 2021-07-13 RX ORDER — ASPIRIN 81 MG/1
324 TABLET, CHEWABLE ORAL ONCE
Status: COMPLETED | OUTPATIENT
Start: 2021-07-13 | End: 2021-07-13

## 2021-07-13 RX ORDER — LEVOTHYROXINE SODIUM 0.15 MG/1
TABLET ORAL
COMMUNITY
Start: 2020-09-11

## 2021-07-13 RX ORDER — SODIUM CHLORIDE 0.9 % (FLUSH) 0.9 %
5-40 SYRINGE (ML) INJECTION EVERY 12 HOURS SCHEDULED
Status: DISCONTINUED | OUTPATIENT
Start: 2021-07-13 | End: 2021-07-14 | Stop reason: HOSPADM

## 2021-07-13 RX ORDER — SODIUM CHLORIDE 0.9 % (FLUSH) 0.9 %
5-40 SYRINGE (ML) INJECTION PRN
Status: DISCONTINUED | OUTPATIENT
Start: 2021-07-13 | End: 2021-07-14 | Stop reason: HOSPADM

## 2021-07-13 RX ORDER — HEPARIN SODIUM 1000 [USP'U]/ML
4000 INJECTION, SOLUTION INTRAVENOUS; SUBCUTANEOUS ONCE
Status: COMPLETED | OUTPATIENT
Start: 2021-07-13 | End: 2021-07-13

## 2021-07-13 RX ORDER — HEPARIN SODIUM 10000 [USP'U]/100ML
1000 INJECTION, SOLUTION INTRAVENOUS CONTINUOUS
Status: DISCONTINUED | OUTPATIENT
Start: 2021-07-13 | End: 2021-07-13

## 2021-07-13 RX ORDER — AMLODIPINE BESYLATE 5 MG/1
10 TABLET ORAL DAILY
Status: DISCONTINUED | OUTPATIENT
Start: 2021-07-13 | End: 2021-07-14 | Stop reason: HOSPADM

## 2021-07-13 RX ORDER — LEVOTHYROXINE SODIUM 0.15 MG/1
150 TABLET ORAL DAILY
Status: DISCONTINUED | OUTPATIENT
Start: 2021-07-13 | End: 2021-07-14 | Stop reason: HOSPADM

## 2021-07-13 RX ORDER — SODIUM CHLORIDE 9 MG/ML
25 INJECTION, SOLUTION INTRAVENOUS PRN
Status: DISCONTINUED | OUTPATIENT
Start: 2021-07-13 | End: 2021-07-14 | Stop reason: HOSPADM

## 2021-07-13 RX ORDER — POLYETHYLENE GLYCOL 3350 17 G/17G
17 POWDER, FOR SOLUTION ORAL DAILY PRN
Status: DISCONTINUED | OUTPATIENT
Start: 2021-07-13 | End: 2021-07-14 | Stop reason: HOSPADM

## 2021-07-13 RX ORDER — MORPHINE SULFATE 2 MG/ML
2 INJECTION, SOLUTION INTRAMUSCULAR; INTRAVENOUS EVERY 4 HOURS PRN
Status: DISCONTINUED | OUTPATIENT
Start: 2021-07-13 | End: 2021-07-14 | Stop reason: HOSPADM

## 2021-07-13 RX ORDER — MIDAZOLAM HYDROCHLORIDE 5 MG/ML
INJECTION INTRAMUSCULAR; INTRAVENOUS
Status: COMPLETED | OUTPATIENT
Start: 2021-07-13 | End: 2021-07-13

## 2021-07-13 RX ORDER — HEPARIN SODIUM 10000 [USP'U]/100ML
910 INJECTION, SOLUTION INTRAVENOUS CONTINUOUS
Status: DISCONTINUED | OUTPATIENT
Start: 2021-07-13 | End: 2021-07-13

## 2021-07-13 RX ORDER — ACETAMINOPHEN 650 MG/1
650 SUPPOSITORY RECTAL EVERY 6 HOURS PRN
Status: DISCONTINUED | OUTPATIENT
Start: 2021-07-13 | End: 2021-07-14 | Stop reason: HOSPADM

## 2021-07-13 RX ORDER — FENTANYL CITRATE 50 UG/ML
INJECTION, SOLUTION INTRAMUSCULAR; INTRAVENOUS
Status: COMPLETED | OUTPATIENT
Start: 2021-07-13 | End: 2021-07-13

## 2021-07-13 RX ORDER — ASPIRIN 81 MG/1
81 TABLET, CHEWABLE ORAL DAILY
Status: DISCONTINUED | OUTPATIENT
Start: 2021-07-14 | End: 2021-07-14 | Stop reason: HOSPADM

## 2021-07-13 RX ORDER — ACETAMINOPHEN 325 MG/1
650 TABLET ORAL EVERY 4 HOURS PRN
Status: DISCONTINUED | OUTPATIENT
Start: 2021-07-13 | End: 2021-07-14 | Stop reason: HOSPADM

## 2021-07-13 RX ORDER — HEPARIN SODIUM 1000 [USP'U]/ML
4000 INJECTION, SOLUTION INTRAVENOUS; SUBCUTANEOUS PRN
Status: DISCONTINUED | OUTPATIENT
Start: 2021-07-13 | End: 2021-07-13

## 2021-07-13 RX ORDER — CLOPIDOGREL BISULFATE 75 MG/1
75 TABLET ORAL DAILY
Status: DISCONTINUED | OUTPATIENT
Start: 2021-07-13 | End: 2021-07-13

## 2021-07-13 RX ORDER — ASPIRIN 325 MG
325 TABLET ORAL DAILY
Status: DISCONTINUED | OUTPATIENT
Start: 2021-07-13 | End: 2021-07-13

## 2021-07-13 RX ORDER — NITROGLYCERIN 0.4 MG/1
0.4 TABLET SUBLINGUAL EVERY 5 MIN PRN
Status: DISCONTINUED | OUTPATIENT
Start: 2021-07-13 | End: 2021-07-14 | Stop reason: HOSPADM

## 2021-07-13 RX ORDER — SODIUM CHLORIDE 9 MG/ML
INJECTION, SOLUTION INTRAVENOUS CONTINUOUS
Status: DISCONTINUED | OUTPATIENT
Start: 2021-07-13 | End: 2021-07-14 | Stop reason: HOSPADM

## 2021-07-13 RX ORDER — EZETIMIBE 10 MG/1
10 TABLET ORAL DAILY
Status: DISCONTINUED | OUTPATIENT
Start: 2021-07-13 | End: 2021-07-14 | Stop reason: HOSPADM

## 2021-07-13 RX ORDER — ONDANSETRON 4 MG/1
4 TABLET, ORALLY DISINTEGRATING ORAL EVERY 8 HOURS PRN
Status: DISCONTINUED | OUTPATIENT
Start: 2021-07-13 | End: 2021-07-14 | Stop reason: HOSPADM

## 2021-07-13 RX ORDER — HEPARIN SODIUM 1000 [USP'U]/ML
2000 INJECTION, SOLUTION INTRAVENOUS; SUBCUTANEOUS PRN
Status: DISCONTINUED | OUTPATIENT
Start: 2021-07-13 | End: 2021-07-13

## 2021-07-13 RX ORDER — ONDANSETRON 2 MG/ML
4 INJECTION INTRAMUSCULAR; INTRAVENOUS EVERY 6 HOURS PRN
Status: DISCONTINUED | OUTPATIENT
Start: 2021-07-13 | End: 2021-07-14 | Stop reason: HOSPADM

## 2021-07-13 RX ORDER — EZETIMIBE 10 MG/1
TABLET ORAL
COMMUNITY
Start: 2020-09-09

## 2021-07-13 RX ADMIN — CLOPIDOGREL BISULFATE 75 MG: 75 TABLET ORAL at 08:48

## 2021-07-13 RX ADMIN — ASPIRIN 325 MG: 325 TABLET ORAL at 08:48

## 2021-07-13 RX ADMIN — LISINOPRIL 10 MG: 10 TABLET ORAL at 08:48

## 2021-07-13 RX ADMIN — HEPARIN SODIUM 12 UNITS/KG/HR: 10000 INJECTION, SOLUTION INTRAVENOUS at 01:58

## 2021-07-13 RX ADMIN — ASPIRIN 324 MG: 81 TABLET, CHEWABLE ORAL at 01:57

## 2021-07-13 RX ADMIN — MIDAZOLAM HYDROCHLORIDE 2 MG: 5 INJECTION INTRAMUSCULAR; INTRAVENOUS at 12:56

## 2021-07-13 RX ADMIN — HEPARIN SODIUM 910 UNITS/HR: 10000 INJECTION, SOLUTION INTRAVENOUS at 11:20

## 2021-07-13 RX ADMIN — AMLODIPINE BESYLATE 10 MG: 5 TABLET ORAL at 08:48

## 2021-07-13 RX ADMIN — FENTANYL CITRATE 50 MCG: 50 INJECTION, SOLUTION INTRAMUSCULAR; INTRAVENOUS at 12:56

## 2021-07-13 RX ADMIN — HEPARIN SODIUM 5000 UNITS: 1000 INJECTION, SOLUTION INTRAVENOUS; SUBCUTANEOUS at 13:23

## 2021-07-13 RX ADMIN — MIDAZOLAM HYDROCHLORIDE 1 MG: 5 INJECTION INTRAMUSCULAR; INTRAVENOUS at 13:28

## 2021-07-13 RX ADMIN — FENTANYL CITRATE 50 MCG: 50 INJECTION, SOLUTION INTRAMUSCULAR; INTRAVENOUS at 13:28

## 2021-07-13 RX ADMIN — HEPARIN SODIUM 2000 UNITS: 1000 INJECTION, SOLUTION INTRAVENOUS; SUBCUTANEOUS at 14:04

## 2021-07-13 RX ADMIN — LEVOTHYROXINE SODIUM 150 MCG: 0.15 TABLET ORAL at 08:48

## 2021-07-13 RX ADMIN — TICAGRELOR 90 MG: 90 TABLET ORAL at 21:15

## 2021-07-13 RX ADMIN — HEPARIN SODIUM 4000 UNITS: 1000 INJECTION INTRAVENOUS; SUBCUTANEOUS at 01:57

## 2021-07-13 ASSESSMENT — ENCOUNTER SYMPTOMS
VOMITING: 0
DIARRHEA: 0
SHORTNESS OF BREATH: 0
PHOTOPHOBIA: 0
SHORTNESS OF BREATH: 1
ABDOMINAL PAIN: 0
RHINORRHEA: 0
EYE ITCHING: 0
NAUSEA: 0
CONSTIPATION: 0
BLOOD IN STOOL: 0
NAUSEA: 1
COUGH: 0
SORE THROAT: 0
BACK PAIN: 0
EYE PAIN: 0

## 2021-07-13 ASSESSMENT — PAIN SCALES - GENERAL
PAINLEVEL_OUTOF10: 0
PAINLEVEL_OUTOF10: 2

## 2021-07-13 ASSESSMENT — PAIN DESCRIPTION - LOCATION: LOCATION: CHEST

## 2021-07-13 ASSESSMENT — PAIN DESCRIPTION - PAIN TYPE: TYPE: ACUTE PAIN

## 2021-07-13 ASSESSMENT — PAIN DESCRIPTION - DESCRIPTORS: DESCRIPTORS: PRESSURE

## 2021-07-13 ASSESSMENT — PAIN DESCRIPTION - FREQUENCY: FREQUENCY: CONTINUOUS

## 2021-07-13 ASSESSMENT — PAIN DESCRIPTION - PROGRESSION: CLINICAL_PROGRESSION: NOT CHANGED

## 2021-07-13 ASSESSMENT — PAIN DESCRIPTION - ONSET: ONSET: ON-GOING

## 2021-07-13 ASSESSMENT — PAIN DESCRIPTION - ORIENTATION: ORIENTATION: UPPER

## 2021-07-13 NOTE — CONSULTS
Pharmacy to Manage Heparin Infusion per [facility]    Dx: Elevated troponin  Pt wt = 90.7 kg  Baseline aPTT = ordered. Low Dose Heparin Infusion  Heparin 60 units/kg IVP bolus followed by Heparin infusion at 12 units/kg/hr (recommended initial max dose 1000 units./hr). Recheck aPTT in 6 hours. Goal aPTT = 60-90 seconds.   Diamond Souza, Pharm D.7/13/2021 1:42 AM    Recent Labs     07/13/21  0841   APTT 101.8*     Hold x 1 hr  Decrease Heparin infusion rate to 910 units/hr  Recheck aPTT in 6 hours  Liv PachecoD 7/13/2021  9:47 AM

## 2021-07-13 NOTE — CONSULTS
[facility]  [phone]      Attending Physician: Ysabel Guillory MD  Reason for Consultation/Chief Complaint: NSTEMI    Subjective   History of Present Illness:  Apple Santoyo is a 60 y.o. male with a history of TIA, PVD, bilateral carotid artery disease s/p right carotid artery stenting and left carotid artery CEA, asymptomatic left subclavian artery stenosis, heterozygous factor V Leiden mutation, essential hypertension, and tobacco use who presented with chest pain. The patient reports that last night between 10-11 PM while lying in bed, he developed sudden on substernal chest tightness and became short of breath and diaphoretic. His symptoms persisted for several minutes so he decided to go to the ED. On arrival, he was afebrile with BP of 153/100, HR 71, RR 18, and O2 saturation of 96% on room air. Initial ECG showed normal sinus rhythm with inferolateral T wave inversions. Troponins trended 0.06-->0.09-->0. 13. He was subsequently started on an IV heparin infusion and continued to aspirin and Plavix. Review of his telemetry shows a short run of non-sustained VT earlier today. Currently, he is resting comfortably in bed and says that his chest pain has resolved. He typically gets his health care through the [facility] and says that he had a normal stress test a few years ago. He has never undergone cardiac catheterization. He smokes 2 packs of cigarettes per day and has done so for several years. Past Medical History:   has a past medical history of Atherosclerosis, Carotid stenosis, Heterozygous factor V Leiden mutation ([facility] [address] History of TIAs, and Hypertension. Surgical History:   has a past surgical history that includes Carotid endarterectomy (Right); vascular surgery (Bilateral); Appendectomy; and Carotid endarterectomy (Left, 11/19/2020). Social History:   reports that he has been smoking cigarettes. He has a 90.00 pack-year smoking history.  He has never used smokeless tobacco. He reports current alcohol use. He reports that he does not use drugs. Family History:  family history includes Heart Attack in his father; Other in his mother. Home Medications:  Were reviewed and are listed in nursing record and/or below  Prior to Admission medications    Medication Sig Start Date End Date Taking?  Authorizing Provider   levothyroxine (SYNTHROID) 150 MCG tablet TAKE ONE TABLET BY MOUTH AT BEDTIME ON AN EMPTY STOMACH FOR THYROID 9/11/20  Yes Historical Provider, MD   ezetimibe (ZETIA) 10 MG tablet TAKE ONE TABLET BY MOUTH ONCE DAILY 9/9/20  Yes Historical Provider, MD   amLODIPine (NORVASC) 10 MG tablet Take 10 mg by mouth daily   Yes Historical Provider, MD   clopidogrel (PLAVIX) 75 MG tablet Take 75 mg by mouth daily   Yes Historical Provider, MD   aspirin 325 MG tablet Take 325 mg by mouth daily    Yes Historical Provider, MD   lisinopril (PRINIVIL;ZESTRIL) 10 MG tablet Take 10 mg by mouth daily   Yes Historical Provider, MD        CURRENT Medications:  amLODIPine (NORVASC) tablet 10 mg, Daily  levothyroxine (SYNTHROID) tablet 150 mcg, Daily  ezetimibe (ZETIA) tablet 10 mg, Daily  sodium chloride flush 0.9 % injection 5-40 mL, 2 times per day  sodium chloride flush 0.9 % injection 5-40 mL, PRN  0.9 % sodium chloride infusion, PRN  ondansetron (ZOFRAN-ODT) disintegrating tablet 4 mg, Q8H PRN   Or  ondansetron (ZOFRAN) injection 4 mg, Q6H PRN  polyethylene glycol (GLYCOLAX) packet 17 g, Daily PRN  acetaminophen (TYLENOL) tablet 650 mg, Q6H PRN   Or  acetaminophen (TYLENOL) suppository 650 mg, Q6H PRN  morphine (PF) injection 2 mg, Q4H PRN  nitroGLYCERIN (NITROSTAT) SL tablet 0.4 mg, Q5 Min PRN  perflutren lipid microspheres (DEFINITY) injection 1.65 mg, ONCE PRN  0.9 % sodium chloride infusion, Continuous  [START ON 7/14/2021] aspirin chewable tablet 81 mg, Daily  sodium chloride flush 0.9 % injection 5-40 mL, 2 times per day  sodium chloride flush 0.9 % injection 5-40 mL, PRN  0.9 % sodium chloride infusion, PRN  acetaminophen (TYLENOL) tablet 650 mg, Q4H PRN  ticagrelor (BRILINTA) tablet 90 mg, BID        Allergies:  Penicillins     Review of Systems:   A 14 point review of symptoms completed. Pertinent positives identified in the HPI, all other review of symptoms negative as below. Review of Systems   Constitutional: Positive for diaphoresis. Negative for chills and fever. HENT: Negative for congestion, rhinorrhea and sore throat. Eyes: Negative for photophobia, pain and visual disturbance. Respiratory: Positive for shortness of breath. Negative for cough. Cardiovascular: Positive for chest pain. Negative for palpitations and leg swelling. Gastrointestinal: Negative for abdominal pain, blood in stool, constipation, diarrhea, nausea and vomiting. Endocrine: Negative for cold intolerance and heat intolerance. Genitourinary: Negative for difficulty urinating, dysuria and hematuria. Musculoskeletal: Positive for arthralgias and myalgias. Negative for joint swelling. Skin: Negative for rash and wound. Allergic/Immunologic: Negative for environmental allergies and food allergies. Neurological: Negative for dizziness, syncope and light-headedness. Hematological: Negative for adenopathy. Does not bruise/bleed easily. Psychiatric/Behavioral: Negative for dysphoric mood. The patient is not nervous/anxious. Objective   PHYSICAL EXAM:    Vitals:    07/13/21 1550   BP: (!) 150/81   Pulse: 65   Resp: 18   Temp: 97.8 °F (36.6 °C)   SpO2: 96%    Weight: 200 lb (90.7 kg)       General: Adult male lying in bed in no acute distress. Pleasant and interactive on exam.  HEENT: Normocephalic, atraumatic, non-icteric, hearing intact, nares normal, mucous membranes moist.  Neck: Supple, trachea midline. No adenopathy. No thyromegaly. No appreciable carotid bruits. Left CEA scar noted. No JVD. Heart: Bradycardic with regular rhythm.  Normal S1 and S2. Grade II/VI holosystolic murmur. No rubs or gallops. Lungs: Normal respiratory effort. Breath sounds diminished bilaterally. No wheezes, rales, or rhonchi. Abdomen: Soft, non-tender. Normoactive bowel sounds. No masses or organomegaly. Skin: No rashes, wounds, or lesions. Pulses: Right radial artery pulse 2+. Left radial artery pulse not palpable. Extremities: No clubbing, cyanosis, or edema. Musculoskeletal: Spontaneously moves all four extremities. Psych: Normal mood and affect. Neuro: Alert and oriented to person, place, and time. Labs   CBC:   Lab Results   Component Value Date    WBC 9.2 07/13/2021    RBC 5.44 07/13/2021    HGB 16.3 07/13/2021    HCT 47.5 07/13/2021    MCV 87.3 07/13/2021    RDW 15.4 07/13/2021     07/13/2021     CMP:  Lab Results   Component Value Date     07/13/2021    K 4.4 07/13/2021     07/13/2021    CO2 23 07/13/2021    BUN 20 07/13/2021    CREATININE 1.4 07/13/2021    GFRAA >60 07/13/2021    AGRATIO 1.2 07/13/2021    LABGLOM 52 07/13/2021    GLUCOSE 120 07/13/2021    PROT 7.4 07/13/2021    CALCIUM 9.2 07/13/2021    BILITOT <0.2 07/13/2021    ALKPHOS 70 07/13/2021    AST 20 07/13/2021    ALT 26 07/13/2021     PT/INR:  No results found for: PTINR  HgBA1c:No results found for: LABA1C  Lab Results   Component Value Date    TROPONINI 0.13 (H) 07/13/2021         Cardiac Data     Last EKG: Normal sinus rhythm, inferolateral T wave inversions. Echo: N/A    Stress Test: Reports stress test a few years ago at [facility] that was normal.    Cath: N/A    Other Studies:   Chest X-ray 7/13/21:  Unremarkable single portable AP view of the chest.      Assessment:      1. NSTEMI  2. Mild acute kidney injury  3. Non-sustained VT  4. H/o TIA  5. PVD  6. Bilateral carotid artery disease s/p right carotid artery stenting and left carotid artery CEA  7. Asymptomatic left subclavian artery stenosis  8. Heterozygous factor V Leiden mutation  9. Essential hypertension  10.  Tobacco use Plan:     1. Overall, presentation is concerning for a type 1 NSTEMI in a patient with known extensive peripheral vascular disease. He currently remains hemodynamically stable and his angina seems to have resolved. 2. Risks/benefits of invasive coronary angiography with possible percutaneous coronary intervention were thoroughly reviewed with both the patient and his wife and he agrees to proceed. 3. Please keep NPO and will arrange for invasive coronary angiography later today for definitive assessment of patient's coronary anatomy. 4. Continue IV heparin infusion. 5. Continue aspirin 81 mg daily and Plavix 75 mg daily. 6. Hydrate with IVF and hold ACEI in setting of mild MALCOLM. 7. He is intolerant to statins and will attempt to get approved for PCSK9 inhibitor. Continue ezetimibe 10 mg daily. 8. Hold off on beta-blocker due to baseline bradycardia. 9. Continue amlodipine 10 mg daily. 10. OK to give SL nitroglycerin prn. 11. Trend troponins until peak. 12. Obtain transthoracic echocardiogram for formal evaluation of cardiac structure and function. 13. Continue to monitor on telemetry and repeat ECG for any rhythm changes or new episodes of chest pain. 15. Counseled on risks of continued tobacco use and strongly encouraged smoking cessation. Thank you for allowing us to participate in the care of Kj Gonzalez. Please call me with any questions [phone]. Yue Jung, DO [address]  [phone] [facility]  [phone] [address] [place] [address]  7/13/2021 5:24 PM      I will address the patient's cardiac risk factors and adjusted pharmacologic treatment as needed. In addition, I have reinforced the need for patient directed risk factor modification. All questions and concerns were addressed to the patient/family. Alternatives to my treatment were discussed. The note was completed using EMR.  Every effort was made to ensure accuracy; however, inadvertent computerized transcription errors may be present.

## 2021-07-13 NOTE — PROCEDURES
CARDIAC CATHETERIZATION REPORT    Date of Procedure: 7/13/2021  : Heddy Schilder Haddox, DO  Primary Indication: NSTEMI    Procedures Performed:  1. Coronary angiography  2. Left heart catheterization  3. PCI of proximal to distal RCA with 2 overlapping SERGIO  4. Moderate conscious sedation    Procedural Details:  1. Access: Local anesthetic was given and access was obtained in the right radial artery using a micropuncture technique and a 6F Terumo Slender Sheath was placed without difficulty. 2. Diagnostic: A 5F TIG catheter was used to perform selective right and left coronary angiography. The 5F TIG catheter was also used to perform the left heart catheterization. No significant gradient was observed on pull-back of the catheter across the aortic valve. 3. Intervention (PCI of proximal to distal RCA): Therapeutic ACT was achieved with the administration of IV heparin. Using a 6F JR4 guide catheter, a 0.014 Prowater wire was advanced across the sequential proximal, mid-, and distal RCA lesions and into the RPDA. The distal back to the proximal RCA was pre-dilated with a non-compliant 2.0 x 20 mm balloon. The proximal to distal RCA was then stented with overlapping Resolute Alvaro 2.25 x 39 mm and 2.5 x 38 mm SERGIO. The distal portion of the stented segment was post-dilated with a non-compliant 2.25 x 20 mm balloon at high pressure, the mid-portion of the stented segment was dilated with non-compliant 2.5 x 20 mm balloon at high pressure, and the proximal portion was dilated with a non-compliant 3.0 x 20 mm balloon at high pressure. Final angiography showed <10% residual stenosis with JASON 3 flow and no evidence of dissection. 4. Hemostasis: At the end of the procedure, the radial sheath was removed and a hemoband was placed over the arteriotomy site and filled with air to maintain hemostasis. Findings:  1. Hemodynamics:     A. Opening arterial pressure: 130/74 (96) mmHg      B. LVEDP: 9 mmHg    2. Coronary anatomy:  A. Left main artery: The left main artery bifurcates into the left anterior descending artery and left circumflex artery. The left main artery has minor luminal irregularities. B. Left anterior descending artery: Transapical vessel which gives rise to one diagonal artery. The LAD has a 40-50% ostial stenosis, sequential 30% and 40% mid-vessel stenoses, and a 30$ distal stenosis. The diagonal artery is a small-medium caliber branching vessel with mild-moderate diffuse disease. C. Left circumflex artery: Non-dominant vessel that gives rise to 2 obtuse marginal arteries. The proximal LCx has sequential 30% and 70% stenoses. The mid-LCx has a 40% stenosis immediately after the bifurcation of the OM1. The AV groove continuation is small and has mild disease. The OM1 is a large branching vessel with sequential 80% and 90% proximal stenoses and a 60% mid-vessel stenosis. The first branch of the OM1 is small and has an 80% ostial stenosis followed by a long 50% proximal to mid-vessel stenosis. The OM2 is small and has a 40% ostial stenosis and 50% distal stenosis. D. Right coronary artery: Dominant vessel. The RCA is diffusely diseased with 95% proximal, 80% mid-vessel, and 80% distal stenoses. The RPDA has minor luminal irregularities. Results of Intervention (PCI of proximal to distal RCA):  Pre - 95% stenosis, JASON 2 flow  Post - <10% stenosis, JASON 3 flow    Technical Factors:  Complications: None. Estimated blood loss: Minimal.  Radiation: Air kerma 1,330 mGy and 13.4 minutes of fluoroscopy  Sedation: Moderate conscious sedation was administered by qualified nursing personnel under continuous hemodynamic monitoring, starting at 12:54 PM and ending at 2:15 PM.  Medications: 2.5 mg IA verapamil, 4 mg IV Versed, 100 mcg IV Fentanyl, 400 mcg IC nitroglycerin, 180 mg PO Brilinta, 7,000 units IV heparin  Contrast: 150 cc of Isovue     Impression:  1. NSTEMI.   2. Severe two-vessel coronary artery disease with proximal RCA culprit lesion. 3. Successful PCI of proximal to distal RCA with overlapping Resolute [place] 2.25 x 30 mm and 2.5 x 38 mm SERGIO, tapered to final diameter of ~3.11 mm.  4. Normal LVEDP. 5. Will planned for staged PCI of proximal LCx/OM1 in 1-2 weeks. Plan:  1. Monitor overnight. 2. Hydrate with IVF. 3. Brilinta 90 mg BID for at least one year. 4. Aspirin 81 mg daily indefinitely. 5. Patient is intolerant to statins. Will continue ezetimibe 10 mg daily and attempt to get approved with PCSK9 inhibitor. 6. Hold off on beta-blocker due to baseline bradycardia. 7. TTE pending for formal evaluation of cardiac structure and function. 8. Plan for staged PCI fo proximal LCx/OM1 in 1-2 weeks.       Torrie Luongs, [facility]

## 2021-07-13 NOTE — CARE COORDINATION
Chart review completed as pt is off the floor. Pt is a/o, ambulatory with PCP and insurance. CM will continue to follow for possible DCP needs.   Asya Go RN

## 2021-07-13 NOTE — ED NOTES
Pt is an ED hold at this time. Orders released. VS changed to Q4 VS per inpatient VS policy. Pt denies pain at this time. Bedside tele monitor on. Will monitor.      Marce Segura RN  07/13/21 2189

## 2021-07-13 NOTE — PRE SEDATION
Brief Pre-Op Note/Sedation Assessment      Ulysses Peels  1965  Cath Pool Rm/NONE      [id number]  1:01 PM    Planned Procedure: Cardiac Catheterization Procedure    Post Procedure Plan: Return to same level of care    Consent: I have discussed with the patient and/or the patient representative the indication, alternatives, and the possible risks and/or complications of the planned procedure and the anesthesia methods. The patient and/or patient representative appear to understand and agree to proceed. Chief Complaint: NSTEMI      Indications for Cath Procedure:  ACS <= 24 hrs  Anginal Classification within 2 weeks:  CCS IV - Inability to perform any activity without angina or angina at rest, i.e., severe limitation  NYHA Heart Failure Class within 2 weeks: No symptoms  Is Cath Lab Visit Valve-related?: No  Surgical Risk: Intermediate  Functional Type: < 4 METS    Anti- Anginal Meds within 2 weeks:   Yes: Ca Channel Blockers, Aspirin and Statin (Any)    Stress or Imaging Studies Performed (within 6 months):  None     Vital Signs:  BP (!) 153/87   Pulse 56   Temp 98.2 °F (36.8 °C) (Oral)   Resp 18   Ht 5' 9\" (1.753 m)   Wt 200 lb (90.7 kg)   SpO2 95%   BMI 29.53 kg/m²     Allergies:   Allergies   Allergen Reactions    Penicillins Rash       Past Medical History:  Past Medical History:   Diagnosis Date    Atherosclerosis     Carotid stenosis     Heterozygous factor V Leiden mutation ([facility] [address]     History of TIAs     Hypertension          Surgical History:  Past Surgical History:   Procedure Laterality Date    APPENDECTOMY      CAROTID ENDARTERECTOMY Right     CAROTID ENDARTERECTOMY Left 11/19/2020    LEFT CAROTID ENDARTERECTOMY performed by Radames Murray MD at [facility] Bilateral     femoral         Medications:  Current Facility-Administered Medications   Medication Dose Route Frequency Provider Last Rate Last Admin    heparin (porcine) injection 4,000 Units  4,000 Units Intravenous PRN Toño Chamorro MD        heparin (porcine) injection 2,000 Units  2,000 Units Intravenous PRN Toño Chamorro MD        amLODIPine (NORVASC) tablet 10 mg  10 mg Oral Daily Toño Chamorro MD   10 mg at 07/13/21 0848    clopidogrel (PLAVIX) tablet 75 mg  75 mg Oral Daily Toño Chamorro MD   75 mg at 07/13/21 0848    levothyroxine (SYNTHROID) tablet 150 mcg  150 mcg Oral Daily Toño Chamorro MD   150 mcg at 07/13/21 0848    ezetimibe (ZETIA) tablet 10 mg  10 mg Oral Daily Toño Chamorro MD        sodium chloride flush 0.9 % injection 5-40 mL  5-40 mL Intravenous 2 times per day Toño Chamorro MD        sodium chloride flush 0.9 % injection 5-40 mL  5-40 mL Intravenous PRN Toño Chamorro MD        0.9 % sodium chloride infusion  25 mL Intravenous PRN Toño Chamorro MD        ondansetron (ZOFRAN-ODT) disintegrating tablet 4 mg  4 mg Oral Q8H PRN Toño Chamorro MD        Or    ondansetron (ZOFRAN) injection 4 mg  4 mg Intravenous Q6H PRN Toño Chamorro MD        polyethylene glycol (GLYCOLAX) packet 17 g  17 g Oral Daily PRN Toño Chamorro MD        acetaminophen (TYLENOL) tablet 650 mg  650 mg Oral Q6H PRN Toño Chamorro MD        Or   Courtney Dick acetaminophen (TYLENOL) suppository 650 mg  650 mg Rectal Q6H PRN Toño Chamorro MD        morphine (PF) injection 2 mg  2 mg Intravenous Q4H PRN Toño Chamorro MD        nitroGLYCERIN (NITROSTAT) SL tablet 0.4 mg  0.4 mg Sublingual Q5 Min PRN Toño Chamorro MD        heparin 25,000 units in dextrose 5% 250 mL (premix) infusion  910 Units/hr Intravenous Continuous Jeni Gilmore MD   Stopped at 07/13/21 1235    perflutren lipid microspheres (DEFINITY) injection 1.65 mg  1.5 mL Intravenous ONCE PRN John Haddox, DO        0.9 % sodium chloride infusion   Intravenous Continuous John Haddox, DO        [START ON 7/14/2021] aspirin chewable tablet 81 mg  81 mg Oral Daily John Abebe DO               Pre-Sedation:    Pre-Sedation Documentation and Exam:  I have personally completed a history, physical exam & review of systems for this patient (see notes). Prior History of Anesthesia Complications:   none    Modified Mallampati:  II (soft palate, uvula, fauces visible)    ASA Classification:  Class 3 - A patient with severe systemic disease that limits activity but is not incapacitating      Faisal Scale: Activity:  2 - Able to move 4 extremities voluntarily on command  Respiration:  2 - Able to breathe deeply and cough freely  Circulation:  2 - BP+/- 20mmHg of normal  Consciousness:  2 - Fully awake  Oxygen Saturation (color):  2 - Able to maintain oxygen saturation >92% on room air    Sedation/Anesthesia Plan:  Guard the patient's safety and welfare. Minimize physical discomfort and pain. Minimize negative psychological responses to treatment by providing sedation and analgesia and maximize the potential amnesia. Patient to meet pre-procedure discharge plan.     Medication Planned:  midazolam intravenously and fentanyl intravenously    Patient is an appropriate candidate for plan of sedation: yes      Electronically signed by Nancy Abebe DO on 7/13/2021 at 1:01 PM

## 2021-07-13 NOTE — H&P
Hospital Medicine History & Physical      PCP: No primary care provider on file. Date of Admission: 7/13/2021    Date of Service: Pt seen/examined on 7/13/2021 and Admitted to Inpatient with expected LOS greater than two midnights due to medical therapy. Chief Complaint:  Chest pain    History Of Present Illness:      60 y.o. male with PMH of bilateral carotid disease s/p right stenting and left CEA, HTN, hypothyroidism and tobacco abuse who presented to [facility] with complaints of non-radiating chest pain that started last night at 10 pm, awoke him from sleep, associated with nausea and diaphoresis. Symptoms persisted so decided to come to the ED. No cough or SOB. No fever/chills. No vomiting or diarrhea. ED course: EKG non-acute. Initial troponin elevated. Hospitalist service was consulted for admission. Past Medical History:          Diagnosis Date    Atherosclerosis     Carotid stenosis     Heterozygous factor V Leiden mutation ([facility] [address]     History of TIAs     Hypertension        Past Surgical History:          Procedure Laterality Date    APPENDECTOMY      CAROTID ENDARTERECTOMY Right     CAROTID ENDARTERECTOMY Left 11/19/2020    LEFT CAROTID ENDARTERECTOMY performed by Adelina Beltran MD at [address] Bilateral     femoral       Medications Prior to Admission:      Prior to Admission medications    Medication Sig Start Date End Date Taking?  Authorizing Provider   levothyroxine (SYNTHROID) 150 MCG tablet TAKE ONE TABLET BY MOUTH AT BEDTIME ON AN EMPTY STOMACH FOR THYROID 9/11/20  Yes Historical Provider, MD   ezetimibe (ZETIA) 10 MG tablet TAKE ONE TABLET BY MOUTH ONCE DAILY 9/9/20  Yes Historical Provider, MD   amLODIPine (NORVASC) 10 MG tablet Take 10 mg by mouth daily   Yes Historical Provider, MD   clopidogrel (PLAVIX) 75 MG tablet Take 75 mg by mouth daily   Yes Historical Provider, MD   aspirin 325 MG tablet Take 325 mg by mouth daily    Yes Historical Provider, MD   lisinopril (PRINIVIL;ZESTRIL) 10 MG tablet Take 10 mg by mouth daily   Yes Historical Provider, MD       Allergies:  Penicillins    Social History:      The patient currently lives at home. TOBACCO:   reports that he has been smoking cigarettes. He has a 90.00 pack-year smoking history. He has never used smokeless tobacco.  ETOH:   reports current alcohol use. E-Cigarettes/Vaping Use     Questions Responses    E-Cigarette/Vaping Use Never User    Start Date     Passive Exposure     Quit Date     Counseling Given     Comments             Family History:      Reviewed in detail. Positive as follows:        Problem Relation Age of Onset    Other Mother         Factor V Leiden    Heart Attack Father        REVIEW OF SYSTEMS COMPLETED:   Pertinent positives as noted in the HPI. All other systems reviewed and negative. PHYSICAL EXAM PERFORMED:    BP (!) 150/81   Pulse 65   Temp 97.8 °F (36.6 °C) (Oral)   Resp 18   Ht 5' 9\" (1.753 m)   Wt 200 lb (90.7 kg)   SpO2 96%   BMI 29.53 kg/m²     General appearance:  No apparent distress, appears stated age and cooperative. HEENT:  Normal cephalic, atraumatic without obvious deformity. Pupils equal, round, and reactive to light. Extra ocular muscles intact. Conjunctivae/corneas clear. Neck: Supple, with full range of motion. No jugular venous distention. Trachea midline. Respiratory:  Normal respiratory effort. Clear to auscultation, bilaterally without Rales/Wheezes/Rhonchi. Cardiovascular:  Regular rate and rhythm with normal S1/S2 without murmurs, rubs or gallops. Abdomen: Soft, non-tender, non-distended with normal bowel sounds. Musculoskeletal:  No clubbing, cyanosis or edema bilaterally. Full range of motion without deformity. Skin: Skin color, texture, turgor normal.  No rashes or lesions. Neurologic:  Neurovascularly intact without any focal sensory/motor deficits.  Cranial nerves: II-XII intact, grossly non-focal.  Psychiatric:  Alert and oriented, thought content appropriate, normal insight  Capillary Refill: Brisk,3 seconds, normal  Peripheral Pulses: +2 palpable, equal bilaterally       Labs:     Recent Labs     07/13/21  0042   WBC 9.2   HGB 16.3   HCT 47.5        Recent Labs     07/13/21  0042 07/13/21  0544   * 136   K 4.2 4.4   CL 99 104   CO2 22 23   BUN 21* 20   CREATININE 1.3 1.4*   CALCIUM 9.4 9.2     Recent Labs     07/13/21  0042   AST 20   ALT 26   BILITOT <0.2   ALKPHOS 70     Recent Labs     07/13/21  0042 07/13/21  0354 07/13/21  0841   TROPONINI 0.06* 0.09* 0.13*     Radiology:    XR CHEST PORTABLE   Final Result   Unremarkable single portable AP view of the chest.             ASSESSMENT:    Active Hospital Problems    Diagnosis Date Noted    NSTEMI (non-ST elevated myocardial infarction) ([facility] [address] [I21.4] 07/13/2021       NSTEMI:  - EKG non-acute in the ED. Troponin trend 0.06/0.09/0. 13.  - Pt was started on heparin infusion.   - Cardiology consulted -- appreciate. Plan for [facility] today. - Continue aspirin, plavix and zetia. - Monitor pt on telemetry. Hypertension:  - Variable conrol. Continue amlodipine and lisinopril. Monitor. Hyperlipidemia:  - FLP is pending. Continue zetia. Has statin intolerance. Tobacco abuse:  - Smokes 2 PPD. Cessation strongly advised/encouraged. H/o carotid stenosis:  - S/p right carotid stenting and L CEA. - On aspirin and plavix. Hypothyroidism:  - Clinically euthyroid. Continue his home dose of levothyroxine. DVT Prophylaxis: On heparin infusion   Diet: ADULT DIET; Regular  Code Status: Full Code    PT/OT Eval Status: not indicated     Dispo - > 2 days        [address], APRN - CNP    Thank you No primary care provider on file. for the opportunity to be involved in this patient's care. If you have any questions or concerns please feel free to contact me at [phone].

## 2021-07-13 NOTE — TELEPHONE ENCOUNTER
Patient is scheduled with Dr. Yon Sidhu for PCI Proximal LCx/OM1 on 7/23/21 at [facility], arrival time of 6:30am to the Cath Lab. Please have patient arrive to the main entrance of [facility] and check in with the registration desk. Please inform patient regarding medication instructions. Remind patient to be NPO after midnight (8 hours prior). Do not apply lotions/creams on skin the day of procedure. COVID testing - Rapid if needed. Patient uses the [place]. Need to know if patient has a current referral/authorization from the [place] to cover procedure or proceed as self pay. Please have him or wife call our office to notify us.

## 2021-07-13 NOTE — TELEPHONE ENCOUNTER
Dr. Walter Blevins would like the patient scheduled for a staged PCI of proximal LCx/OM1 next week. Patient will be discharged home tomorrow. I can review his medications prior to discharge. Thank You.

## 2021-07-13 NOTE — ED PROVIDER NOTES
[address] [facility]  ED  EMERGENCY DEPARTMENT ENCOUNTER      Pt Name: Cristino Rios  MRN: [id number]  [place] 1965  Date of evaluation: 7/13/2021  Provider: Nadeem Richard MD    [address] [place] [address]       Chief Complaint   Patient presents with    Chest Pain     CP across upper chest that woke pt up from sleep at 2200. Pt states hx blood clot in left arm. On 324mg Asa and Plavix daily. HISTORY OF PRESENT ILLNESS   (Location/Symptom, Timing/Onset,Context/Setting, Quality, Duration, Modifying Factors, Severity)  Note limiting factors. Cristino Rios is a 60 y.o. male who presents to the emergency department for acute onset of chest pain that woke him up at about 10 PM.  The patient states that he had gone to bed at about 8. When he suddenly woke up with pain radiating across the anterior chest.  He had a similar episode yesterday however today seemed more severe. Was associated of diaphoresis nausea and lightheadedness. He is on aspirin and Plavix because of a history of von Willebrand's disease. No known heart disease. Patient is presently pain-free. Nursing notes were reviewed. REVIEW OF SYSTEMS    (2-9 systems for level 4, 10 or more for level 5)     Review of Systems   Constitutional: Positive for diaphoresis. Negative for fever. HENT: Negative for rhinorrhea. Eyes: Negative for itching. Respiratory: Negative for cough and shortness of breath. Cardiovascular: Positive for chest pain. Gastrointestinal: Positive for nausea. Negative for abdominal pain and vomiting. Endocrine: Negative for polyuria. Genitourinary: Negative for dysuria. Musculoskeletal: Negative for back pain. Allergic/Immunologic: Negative for environmental allergies. Neurological: Positive for light-headedness.          PAST MEDICAL HISTORY     Past Medical History:   Diagnosis Date    Atherosclerosis     Carotid stenosis     Heterozygous factor V Leiden mutation ([facility] [address]     History of TIAs     Hypertension          SURGICALHISTORY       Past Surgical History:   Procedure Laterality Date    APPENDECTOMY      CAROTID ENDARTERECTOMY Right     CAROTID ENDARTERECTOMY Left 11/19/2020    LEFT CAROTID ENDARTERECTOMY performed by Miranda Wilson MD at [facility] Bilateral     femoral         CURRENT MEDICATIONS       Previous Medications    AMLODIPINE (NORVASC) 10 MG TABLET    Take 10 mg by mouth daily    ASPIRIN 325 MG TABLET    Take 325 mg by mouth daily     CLOPIDOGREL (PLAVIX) 75 MG TABLET    Take 75 mg by mouth daily    EZETIMIBE (ZETIA) 10 MG TABLET    TAKE ONE TABLET BY MOUTH ONCE DAILY    LEVOTHYROXINE (SYNTHROID) 150 MCG TABLET    TAKE ONE TABLET BY MOUTH AT BEDTIME ON AN EMPTY STOMACH FOR THYROID    LISINOPRIL (PRINIVIL;ZESTRIL) 10 MG TABLET    Take 10 mg by mouth daily       ALLERGIES     Penicillins    FAMILY HISTORY       Family History   Problem Relation Age of Onset    Other Mother         Factor V Leiden    Heart Attack Father           SOCIAL HISTORY       Social History     Socioeconomic History    Marital status: Unknown     Spouse name: None    Number of children: None    Years of education: None    Highest education level: None   Occupational History    None   Tobacco Use    Smoking status: Current Every Day Smoker     Packs/day: 2.00     Years: 45.00     Pack years: 90.00     Types: Cigarettes    Smokeless tobacco: Never Used   Vaping Use    Vaping Use: Never used   Substance and Sexual Activity    Alcohol use: Yes     Comment: rare    Drug use: Never    Sexual activity: Yes     Partners: Female   Other Topics Concern    None   Social History Narrative    None     Social Determinants of Health     Financial Resource Strain:     Difficulty of Paying Living Expenses:    Food Insecurity:     Worried About Running Out of Food in the Last Year:     Ran Out of Food in the Last Year:    Transportation Needs:     Lack of Transportation (Medical):      Lack of Transportation (Non-Medical):    Physical Activity:     Days of Exercise per Week:     Minutes of Exercise per Session:    Stress:     Feeling of Stress :    Social Connections:     Frequency of Communication with Friends and Family:     Frequency of Social Gatherings with Friends and Family:     Attends Cheondoism Services:     Active Member of Clubs or Organizations:     Attends Club or Organization Meetings:     Marital Status:    Intimate Partner Violence:     Fear of Current or Ex-Partner:     Emotionally Abused:     Physically Abused:     Sexually Abused:        SCREENINGS    Kip Coma Scale  Eye Opening: Spontaneous  Best Verbal Response: Oriented  Best Motor Response: Obeys commands  McCracken Coma Scale Score: 15        PHYSICAL EXAM    (up to 7 for level 4, 8 or more for level 5)     ED Triage Vitals [07/13/21 0033]   BP Temp Temp Source Pulse Resp SpO2 Height Weight   (!) 153/100 97.8 °F (36.6 °C) Oral 71 18 96 % 5' 9\" (1.753 m) 200 lb (90.7 kg)       Physical Exam  Vitals and nursing note reviewed. Constitutional:       Appearance: Normal appearance. He is well-developed. He is not ill-appearing. HENT:      Head: Normocephalic and atraumatic. Right Ear: External ear normal.      Left Ear: External ear normal.      Nose: Nose normal.   Eyes:      General: No scleral icterus. Right eye: No discharge. Left eye: No discharge. Conjunctiva/sclera: Conjunctivae normal.   Cardiovascular:      Rate and Rhythm: Normal rate and regular rhythm. Heart sounds: Normal heart sounds. Pulmonary:      Effort: Pulmonary effort is normal. No respiratory distress. Breath sounds: Normal breath sounds. No wheezing or rales. Abdominal:      General: Bowel sounds are normal. There is no distension. Palpations: Abdomen is soft. Tenderness: There is no abdominal tenderness. There is no guarding or rebound.    Musculoskeletal:         General: No swelling, tenderness, deformity or signs of injury. Cervical back: Neck supple. Skin:     Coloration: Skin is not pale. Neurological:      Mental Status: He is alert. Psychiatric:         Mood and Affect: Mood normal.         Behavior: Behavior normal.             DIAGNOSTIC RESULTS     EKG: All EKG's are interpreted by the Emergency Department Physician who either signs or Co-signs this chart in the absence of a cardiologist.    12 lead EKG shows normal sinus rhythm at a rate of 72 bpm, LA interval QRS QTC normal.  Normal axis. Patient has T wave inversion in inferior lateral leads. These are new when compared to previous EKG November 2020.     RADIOLOGY:   Non-plain film images such as CT, Ultrasound and MRI are read by the radiologist. Plain radiographic images are visualized and preliminarily interpreted by the emergency physician with the below findings:        Interpretation per the Radiologist below, if available at the time of this note:    XR CHEST PORTABLE   Final Result   Unremarkable single portable AP view of the chest.               ED BEDSIDE ULTRASOUND:   Performed by ED Physician - none    LABS:  Labs Reviewed   COMPREHENSIVE METABOLIC PANEL - Abnormal; Notable for the following components:       Result Value    Sodium 134 (*)     Glucose 117 (*)     BUN 21 (*)     GFR Non- 57 (*)     All other components within normal limits    Narrative:     Performed at:  [address], [facility]   Phone [phone]   TROPONIN - Abnormal; Notable for the following components:    Troponin 0.06 (*)     All other components within normal limits    Narrative:     Performed at:  [address], [facility]   Phone [phone]   TROPONIN - Abnormal; Notable for the following components:    Troponin 0.09 (*)     All other components within normal limits    Narrative:     Performed at:  [place] [address], [facility]i [place]to   Phone [phone]   CBC WITH AUTO DIFFERENTIAL    Narrative:     Performed at:  [facility]) [address], [facility]   Phone [phone]   APTT    Narrative:     Performed at:  [facility]) [address], [facility]   Phone [phone]   APTT   BASIC METABOLIC PANEL W/ REFLEX TO MG FOR LOW K   TROPONIN       All other labs were within normal range or not returned as of this dictation. EMERGENCY DEPARTMENT COURSE and DIFFERENTIAL DIAGNOSIS/MDM:   Vitals:    Vitals:    07/13/21 0135 07/13/21 0205 07/13/21 0230 07/13/21 0306   BP: (!) 124/92 (!) 139/91 126/83 125/79   Pulse: 69 73 59 63   Resp: 20 22 20 20   Temp:    97.6 °F (36.4 °C)   TempSrc:    Oral   SpO2: 94% 92% 92% 93%   Weight:       Height:           Adult male who comes in for acute chest pain which is concerning for ACS. Basic laboratory studies chest x-ray EKG and cardiac monitoring ordered. Cardiac monitor is interpreted by myself shows normal sinus rhythm. Laboratory studies show an elevated troponin. Because of this the patient is given an aspirin and he is started on a heparin drip because of his story and EKG findings. I discussed all findings with the patient and his wife. He is informed that he will need to be admitted for further care he and his wife are both agreeable to consult is placed to the hospitalist on duty was agreed to admit this patient to his service. The patient continues to be pain-free. Cardiac monitor continues to show normal sinus rhythm.                CRITICAL CARE TIME   None     CONSULTS:  IP CONSULT TO HOSPITALIST  IP CONSULT TO CARDIOLOGY    PROCEDURES:       Procedures    FINAL IMPRESSION      1. NSTEMI (non-ST elevated myocardial infarction) [facility])          DISPOSITION/PLAN   DISPOSITION Admitted 07/13/2021 03:05:20 AM      PATIENT REFERREDTO:  No follow-up provider specified.     DISCHARGEMEDICATIONS:  New Prescriptions    No medications on file          (Please note that portions of this note were completed with a voice recognition program.  Efforts were made to edit the dictations but occasionally words are mis-transcribed.)    Chantal Landon MD (electronically signed)  Attending Emergency Physician        Chantal Landon MD  07/13/21 8374

## 2021-07-13 NOTE — ED NOTES
Dr. Darya Mccormick at bedside assessing pt.  Aware of pts trop of 0.06.     Santiago Franco, RN  07/13/21 0121

## 2021-07-14 VITALS
DIASTOLIC BLOOD PRESSURE: 83 MMHG | HEIGHT: 69 IN | HEART RATE: 72 BPM | BODY MASS INDEX: 29.55 KG/M2 | OXYGEN SATURATION: 95 % | RESPIRATION RATE: 16 BRPM | TEMPERATURE: 97.9 F | WEIGHT: 199.5 LBS | SYSTOLIC BLOOD PRESSURE: 134 MMHG

## 2021-07-14 LAB
ANION GAP SERPL CALCULATED.3IONS-SCNC: 11 MMOL/L (ref 3–16)
BUN BLDV-MCNC: 17 MG/DL (ref 7–20)
CALCIUM SERPL-MCNC: 9.4 MG/DL (ref 8.3–10.6)
CHLORIDE BLD-SCNC: 104 MMOL/L (ref 99–110)
CHOLESTEROL, TOTAL: 201 MG/DL (ref 0–199)
CO2: 24 MMOL/L (ref 21–32)
CREAT SERPL-MCNC: 1.4 MG/DL (ref 0.9–1.3)
EKG ATRIAL RATE: 63 BPM
EKG DIAGNOSIS: NORMAL
EKG P AXIS: 5 DEGREES
EKG P-R INTERVAL: 154 MS
EKG Q-T INTERVAL: 440 MS
EKG QRS DURATION: 90 MS
EKG QTC CALCULATION (BAZETT): 450 MS
EKG R AXIS: 51 DEGREES
EKG T AXIS: -29 DEGREES
EKG VENTRICULAR RATE: 63 BPM
GFR AFRICAN AMERICAN: >60
GFR NON-AFRICAN AMERICAN: 52
GLUCOSE BLD-MCNC: 88 MG/DL (ref 70–99)
HCT VFR BLD CALC: 46.2 % (ref 40.5–52.5)
HDLC SERPL-MCNC: 23 MG/DL (ref 40–60)
HEMOGLOBIN: 15.7 G/DL (ref 13.5–17.5)
LDL CHOLESTEROL CALCULATED: 128 MG/DL
MCH RBC QN AUTO: 29.8 PG (ref 26–34)
MCHC RBC AUTO-ENTMCNC: 33.9 G/DL (ref 31–36)
MCV RBC AUTO: 87.8 FL (ref 80–100)
PDW BLD-RTO: 15.5 % (ref 12.4–15.4)
PLATELET # BLD: 259 K/UL (ref 135–450)
PMV BLD AUTO: 7.3 FL (ref 5–10.5)
POTASSIUM REFLEX MAGNESIUM: 4.3 MMOL/L (ref 3.5–5.1)
RBC # BLD: 5.26 M/UL (ref 4.2–5.9)
SODIUM BLD-SCNC: 139 MMOL/L (ref 136–145)
TRIGL SERPL-MCNC: 250 MG/DL (ref 0–150)
VLDLC SERPL CALC-MCNC: 50 MG/DL
WBC # BLD: 8.2 K/UL (ref 4–11)

## 2021-07-14 PROCEDURE — 85027 COMPLETE CBC AUTOMATED: CPT

## 2021-07-14 PROCEDURE — 6370000000 HC RX 637 (ALT 250 FOR IP): Performed by: INTERNAL MEDICINE

## 2021-07-14 PROCEDURE — 80048 BASIC METABOLIC PNL TOTAL CA: CPT

## 2021-07-14 PROCEDURE — 93005 ELECTROCARDIOGRAM TRACING: CPT | Performed by: FAMILY MEDICINE

## 2021-07-14 PROCEDURE — 36415 COLL VENOUS BLD VENIPUNCTURE: CPT

## 2021-07-14 PROCEDURE — 80061 LIPID PANEL: CPT

## 2021-07-14 PROCEDURE — 93010 ELECTROCARDIOGRAM REPORT: CPT | Performed by: INTERNAL MEDICINE

## 2021-07-14 PROCEDURE — 99232 SBSQ HOSP IP/OBS MODERATE 35: CPT | Performed by: NURSE PRACTITIONER

## 2021-07-14 RX ORDER — ASPIRIN 81 MG/1
81 TABLET, CHEWABLE ORAL DAILY
Qty: 30 TABLET | Refills: 3 | Status: SHIPPED | OUTPATIENT
Start: 2021-07-15

## 2021-07-14 RX ADMIN — AMLODIPINE BESYLATE 10 MG: 5 TABLET ORAL at 09:31

## 2021-07-14 RX ADMIN — EZETIMIBE 10 MG: 10 TABLET ORAL at 09:31

## 2021-07-14 RX ADMIN — ASPIRIN 81 MG: 81 TABLET, CHEWABLE ORAL at 09:31

## 2021-07-14 RX ADMIN — LEVOTHYROXINE SODIUM 150 MCG: 0.15 TABLET ORAL at 05:08

## 2021-07-14 RX ADMIN — TICAGRELOR 90 MG: 90 TABLET ORAL at 09:30

## 2021-07-14 ASSESSMENT — ENCOUNTER SYMPTOMS
GASTROINTESTINAL NEGATIVE: 1
RESPIRATORY NEGATIVE: 1

## 2021-07-14 ASSESSMENT — PAIN SCALES - GENERAL: PAINLEVEL_OUTOF10: 0

## 2021-07-14 NOTE — DISCHARGE SUMMARY
Clinically euthyroid. Continue his home dose of levothyroxine. Physical Exam Performed:     /83   Pulse 72   Temp 97.9 °F (36.6 °C) (Oral)   Resp 16   Ht 5' 9\" (1.753 m)   Wt 199 lb 8 oz (90.5 kg)   SpO2 95%   BMI 29.46 kg/m²       General appearance:  No apparent distress, appears stated age and cooperative. HEENT:  Normal cephalic, atraumatic without obvious deformity. Pupils equal, round, and reactive to light. Extra ocular muscles intact. Conjunctivae/corneas clear. Neck: Supple, with full range of motion. No jugular venous distention. Trachea midline. Respiratory:  Normal respiratory effort. Clear to auscultation, bilaterally without Rales/Wheezes/Rhonchi. Cardiovascular:  Regular rate and rhythm with normal S1/S2 without murmurs, rubs or gallops. Abdomen: Soft, non-tender, non-distended with normal bowel sounds. Musculoskeletal:  No clubbing, cyanosis or edema bilaterally. Full range of motion without deformity. Skin: Skin color, texture, turgor normal.  No rashes or lesions. Neurologic:  Neurovascularly intact without any focal sensory/motor deficits. Cranial nerves: II-XII intact, grossly non-focal.  Psychiatric:  Alert and oriented, thought content appropriate, normal insight  Capillary Refill: Brisk,< 3 seconds   Peripheral Pulses: +2 palpable, equal bilaterally       Labs:  For convenience and continuity at follow-up the following most recent labs are provided:      CBC:    Lab Results   Component Value Date    WBC 8.2 07/14/2021    HGB 15.7 07/14/2021    HCT 46.2 07/14/2021     07/14/2021       Renal:    Lab Results   Component Value Date     07/14/2021    K 4.3 07/14/2021     07/14/2021    CO2 24 07/14/2021    BUN 17 07/14/2021    CREATININE 1.4 07/14/2021    CALCIUM 9.4 07/14/2021         Significant Diagnostic Studies    Radiology:   XR CHEST PORTABLE   Final Result   Unremarkable single portable AP view of the chest.                Consults:     IP CONSULT TO HOSPITALIST  IP CONSULT TO CARDIOLOGY  IP CONSULT TO CARDIAC REHAB    Disposition:  Home     Condition at Discharge: Stable    Discharge Instructions/Follow-up:  Follow-up with PCP and Cardiology    Code Status:  Full Code     Activity: activity as tolerated    Diet: cardiac diet      Discharge Medications:     Discharge Medication List as of 7/14/2021 12:56 PM           Details   aspirin 81 MG chewable tablet Take 1 tablet by mouth daily, Disp-30 tablet, R-3Normal      ticagrelor (BRILINTA) 90 MG TABS tablet Take 1 tablet by mouth 2 times daily, Disp-60 tablet, R-11Normal              Details   levothyroxine (SYNTHROID) 150 MCG tablet TAKE ONE TABLET BY MOUTH AT BEDTIME ON AN EMPTY STOMACH FOR THYROIDHistorical Med      ezetimibe (ZETIA) 10 MG tablet TAKE ONE TABLET BY MOUTH ONCE DAILYHistorical Med      amLODIPine (NORVASC) 10 MG tablet Take 10 mg by mouth dailyHistorical Med             Time Spent on discharge is more than 45 minutes in the examination, evaluation, counseling and review of medications and discharge plan. Signed:    QUENTIN Gonzalez CNP   7/14/2021      Thank you No primary care provider on file. for the opportunity to be involved in this patient's care. If you have any questions or concerns please feel free to contact me at [phone].

## 2021-07-14 NOTE — PROGRESS NOTES
[facility]  Cardiology  Progress Note    Admission date:  2021    Reason for follow up visit: NSTEMI/CAD    HPI/CC: Christiano Roche is a 60 y.o. male who was admitted 2021 for chest pain. Troponin elevated. LHC showed RCA disease treated with SERGIO x2, staged PCI LCx/OM in follow up. Echo showed EF 40-45%. No post procedure complications, rhythm overnight has been sinus. Subjective: Denies chest pain, shortness of breath, palpitations and dizziness. Vitals:  Blood pressure 134/83, pulse 72, temperature 97.9 °F (36.6 °C), temperature source Oral, resp. rate 16, height 5' 9\" (1.753 m), weight 199 lb 8 oz (90.5 kg), SpO2 95 %.   Temp  Av °F (36.7 °C)  Min: 97.8 °F (36.6 °C)  Max: 98.4 °F (36.9 °C)  Pulse  Av.5  Min: 56  Max: 72  BP  Min: 123/80  Max: 153/87  SpO2  Av.5 %  Min: 92 %  Max: 96 %    24 hour I/O    Intake/Output Summary (Last 24 hours) at 2021 1102  Last data filed at 2021 0515  Gross per 24 hour   Intake 360 ml   Output 0 ml   Net 360 ml     Current Facility-Administered Medications   Medication Dose Route Frequency Provider Last Rate Last Admin    amLODIPine (NORVASC) tablet 10 mg  10 mg Oral Daily Davie Bettencourt MD   10 mg at 21 0931    levothyroxine (SYNTHROID) tablet 150 mcg  150 mcg Oral Daily Davie Bettencourt MD   150 mcg at 21 0508    ezetimibe (ZETIA) tablet 10 mg  10 mg Oral Daily Davie Bettencourt MD   10 mg at 21 0931    sodium chloride flush 0.9 % injection 5-40 mL  5-40 mL Intravenous 2 times per day Davie Bettencourt MD        sodium chloride flush 0.9 % injection 5-40 mL  5-40 mL Intravenous PRN Davie Bettencourt MD        0.9 % sodium chloride infusion  25 mL Intravenous PRN Davie Bettencourt MD        ondansetron (ZOFRAN-ODT) disintegrating tablet 4 mg  4 mg Oral Q8H PRN Davie Bettencourt MD        Or    ondansetron (ZOFRAN) injection 4 mg  4 mg Intravenous Q6H PRN Davie Bettencourt MD  polyethylene glycol (GLYCOLAX) packet 17 g  17 g Oral Daily PRN Shena Dexter MD        acetaminophen (TYLENOL) tablet 650 mg  650 mg Oral Q6H PRN Shena Dexter MD        Or   [employer] acetaminophen (TYLENOL) suppository 650 mg  650 mg Rectal Q6H PRN Shena Dexter MD        morphine (PF) injection 2 mg  2 mg Intravenous Q4H PRN Shena Dexter MD        nitroGLYCERIN (NITROSTAT) SL tablet 0.4 mg  0.4 mg Sublingual Q5 Min PRN Shena Dexter MD        perflutren lipid microspheres (DEFINITY) injection 1.65 mg  1.5 mL Intravenous ONCE PRN John Sepulvedadox, DO        0.9 % sodium chloride infusion   Intravenous Continuous John Sepulvedadox,  mL/hr at 07/13/21 1553 Rate Change at 07/13/21 1553    aspirin chewable tablet 81 mg  81 mg Oral Daily John Polancox, DO   81 mg at 07/14/21 0931    sodium chloride flush 0.9 % injection 5-40 mL  5-40 mL Intravenous 2 times per day John Polancox, DO        sodium chloride flush 0.9 % injection 5-40 mL  5-40 mL Intravenous PRN John Sepulvedadox, DO        0.9 % sodium chloride infusion  25 mL Intravenous PRN John Polancox, DO        acetaminophen (TYLENOL) tablet 650 mg  650 mg Oral Q4H PRN John Polancox, DO        ticagrelor (BRILINTA) tablet 90 mg  90 mg Oral BID John Polancox, DO   90 mg at 07/14/21 0930     Review of Systems   Constitutional: Negative. Respiratory: Negative. Cardiovascular: Negative. Gastrointestinal: Negative. Neurological: Negative.       Objective:     Telemetry monitor: SR    Physical Exam:  Constitutional:  Comfortable and alert, NAD, appears stated age  Eyes: PERRL, sclera nonicteric  Neck:  Supple, no masses, no thyroidmegaly, no JVD  Skin:  Warm and dry; no rash or lesions  Heart: Regular, normal apex, S1 and S2 normal, no M/G/R  Lungs:  Normal respiratory effort; clear; no wheezing/rhonchi/rales  Abdomen: soft, non tender, + bowel sounds  Extremities:  No edema or cyanosis; no clubbing  Neuro: alert and oriented, moves legs and arms equally, normal mood and affect  Right radial site soft, no hematoma, 2+ pulse    Data Reviewed:    Echo 7/13/2021:   Technically difficult examination. The left ventricular systolic function is mildly reduced with an ejection   fraction of 40-45%. There is septal hypokinesis. There is mild concentric left ventricular hypertrophy. Grade I diastolic dysfunction with normal left ventricular filling pressure. Mild mitral regurgitation. Coronary angiogram 7/13/2021:  Primary Indication: NSTEMI  Procedures Performed:  1. Coronary angiography  2. Left heart catheterization  3. PCI of proximal to distal RCA with 2 overlapping SERGIO  4. Moderate conscious sedation  Results of Intervention (PCI of proximal to distal RCA):  Pre - 95% stenosis, JASON 2 flow  Post - <10% stenosis, JASON 3 flow  Impression:  1. NSTEMI. 2. Severe two-vessel coronary artery disease with proximal RCA culprit lesion. 3. Successful PCI of proximal to distal RCA with overlapping Resolute Alvaro 2.25 x 30 mm and 2.5 x 38 mm SERGIO, tapered to final diameter of ~3.11 mm.  4. Normal LVEDP. 5. Will planned for staged PCI of proximal LCx/OM1 in 1-2 weeks. Plan:  1. Monitor overnight. 2. Hydrate with IVF. 3. Brilinta 90 mg BID for at least one year. 4. Aspirin 81 mg daily indefinitely. 5. Patient is intolerant to statins. Will continue ezetimibe 10 mg daily and attempt to get approved with PCSK9 inhibitor. 6. Hold off on beta-blocker due to baseline bradycardia. 7. TTE pending for formal evaluation of cardiac structure and function. 8. Plan for staged PCI fo proximal LCx/OM1 in 1-2 weeks.     Lab Reviewed:     Renal Profile:  Lab Results   Component Value Date    CREATININE 1.4 07/14/2021    BUN 17 07/14/2021     07/14/2021    K 4.3 07/14/2021     07/14/2021    CO2 24 07/14/2021     CBC:    Lab Results   Component Value Date    WBC 8.2 07/14/2021    RBC 5.26 07/14/2021    HGB 15.7 07/14/2021    HCT 46.2

## 2021-07-14 NOTE — FLOWSHEET NOTE
07/13/21 2048   Assessment   Charting Type Shift assessment   Neurological   Neuro (WDL) WDL   Level of Consciousness Alert (0)   Kip Coma Scale   Eye Opening 4   Best Verbal Response 5   Best Motor Response 6   Kip Coma Scale Score 15   HEENT   HEENT (WDL) X   Right Eye Impaired vision   Left Eye Impaired vision   Respiratory   Respiratory (WDL) WDL   Cardiac   Cardiac (WDL) WDL   Cardiac Regularity Regular   Heart Sounds S1, S2   Cardiac Rhythm NSR   Cardiac Monitor   Telemetry Monitor On Yes   Telemetry Audible Yes   Telemetry Alarms Set Yes   Puncture Site Assessment 1   Location Radial - right   Site Assessment No redness, drainage, swelling or hematoma   Hemostasis Intervention Radial Compression Device   Multiple puncture sites No   Skin Color/Condition   Skin Color/Condition (WDL) WDL   Skin Integrity   Skin Integrity (WDL) WDL   Musculoskeletal   Musculoskeletal (WDL) WDL   Genitourinary   Genitourinary (WDL) WDL   Flank Tenderness No   Suprapubic Tenderness No   Dysuria No   Anus/Rectum   Anus/Rectum (WDL) WDL   Psychosocial   Psychosocial (WDL) WDL

## 2021-07-15 LAB
POC ACT LR: 284 SEC
POC ACT LR: 311 SEC
POC ACT LR: 381 SEC

## 2021-07-15 NOTE — TELEPHONE ENCOUNTER
Discharge instructions put on patient's AVS when discharged on 7/14/21. He is to take all of his morning medications as usual.  He and his wife will contact th [address] [employer] regarding authorization for PCI.

## 2021-07-19 NOTE — TELEPHONE ENCOUNTER
LM for return call to check on ref/auth from [place] and also with possibility to move procedure due to cath lab room being down.

## 2021-07-19 NOTE — TELEPHONE ENCOUNTER
Spoke with patient's wife. She states she spoke with a Scot at the [place] on Friday. She will call when she is back home to provide any information she had written down from that conversation. She also states patient really wants/needs to have procedure done Friday. He gets very stressed when dealing with work issues.

## 2021-07-22 NOTE — TELEPHONE ENCOUNTER
Spoke with patient's wife Abdirahman Gill. Procedure is confirmed. I spoke with Mikaela Damian @ [phone] who transferred me to [employer]. Procedure was approved 7/21. Paperwork is still being completed and she will fax to [phone] attention Jessica Bell as soon as it is ready.

## 2021-07-22 NOTE — TELEPHONE ENCOUNTER
Attempted to reach wife. Unable to leave VM. LM on patient's VM. Need return call regarding [employer] authorization or patient will be changed to self pay and how do they want to proceed. Patient is scheduled for tomorrow.

## 2021-07-23 ENCOUNTER — HOSPITAL ENCOUNTER (OUTPATIENT)
Dept: CARDIAC CATH/INVASIVE PROCEDURES | Age: 56
Setting detail: OBSERVATION
LOS: 1 days | Discharge: HOME | End: 2021-07-24
Attending: INTERNAL MEDICINE | Admitting: INTERNAL MEDICINE
Payer: OTHER GOVERNMENT

## 2021-07-23 PROBLEM — I25.10 CAD IN NATIVE ARTERY: Status: ACTIVE | Noted: 2021-07-23

## 2021-07-23 PROBLEM — Z98.61 CAD S/P PERCUTANEOUS CORONARY ANGIOPLASTY: Status: ACTIVE | Noted: 2021-07-23

## 2021-07-23 PROBLEM — I25.10 CAD S/P PERCUTANEOUS CORONARY ANGIOPLASTY: Status: ACTIVE | Noted: 2021-07-23

## 2021-07-23 LAB
A/G RATIO: 1.1 (ref 1.1–2.2)
ALBUMIN SERPL-MCNC: 4.5 G/DL (ref 3.4–5)
ALP BLD-CCNC: 73 U/L (ref 40–129)
ALT SERPL-CCNC: 20 U/L (ref 10–40)
ANION GAP SERPL CALCULATED.3IONS-SCNC: 13 MMOL/L (ref 3–16)
AST SERPL-CCNC: 15 U/L (ref 15–37)
BILIRUB SERPL-MCNC: 0.4 MG/DL (ref 0–1)
BUN BLDV-MCNC: 19 MG/DL (ref 7–20)
CALCIUM SERPL-MCNC: 10.1 MG/DL (ref 8.3–10.6)
CHLORIDE BLD-SCNC: 101 MMOL/L (ref 99–110)
CHOLESTEROL, TOTAL: 197 MG/DL (ref 0–199)
CO2: 23 MMOL/L (ref 21–32)
CREAT SERPL-MCNC: 1.4 MG/DL (ref 0.9–1.3)
EKG ATRIAL RATE: 74 BPM
EKG DIAGNOSIS: NORMAL
EKG P AXIS: 6 DEGREES
EKG P-R INTERVAL: 162 MS
EKG Q-T INTERVAL: 414 MS
EKG QRS DURATION: 94 MS
EKG QTC CALCULATION (BAZETT): 459 MS
EKG R AXIS: 32 DEGREES
EKG T AXIS: -22 DEGREES
EKG VENTRICULAR RATE: 74 BPM
GFR AFRICAN AMERICAN: >60
GFR NON-AFRICAN AMERICAN: 52
GLOBULIN: 4.2 G/DL
GLUCOSE BLD-MCNC: 117 MG/DL (ref 70–99)
HCT VFR BLD CALC: 48.7 % (ref 40.5–52.5)
HDLC SERPL-MCNC: 23 MG/DL (ref 40–60)
HEMOGLOBIN: 16.9 G/DL (ref 13.5–17.5)
LDL CHOLESTEROL CALCULATED: 138 MG/DL
MCH RBC QN AUTO: 29.9 PG (ref 26–34)
MCHC RBC AUTO-ENTMCNC: 34.8 G/DL (ref 31–36)
MCV RBC AUTO: 86.1 FL (ref 80–100)
PDW BLD-RTO: 15.1 % (ref 12.4–15.4)
PLATELET # BLD: 350 K/UL (ref 135–450)
PMV BLD AUTO: 7 FL (ref 5–10.5)
POC ACT LR: 260 SEC
POC ACT LR: 291 SEC
POC ACT LR: 372 SEC
POTASSIUM SERPL-SCNC: 4.2 MMOL/L (ref 3.5–5.1)
RBC # BLD: 5.65 M/UL (ref 4.2–5.9)
SODIUM BLD-SCNC: 137 MMOL/L (ref 136–145)
TOTAL PROTEIN: 8.7 G/DL (ref 6.4–8.2)
TRIGL SERPL-MCNC: 179 MG/DL (ref 0–150)
VLDLC SERPL CALC-MCNC: 36 MG/DL
WBC # BLD: 10.5 K/UL (ref 4–11)

## 2021-07-23 PROCEDURE — 92928 PRQ TCAT PLMT NTRAC ST 1 LES: CPT | Performed by: INTERNAL MEDICINE

## 2021-07-23 PROCEDURE — G0378 HOSPITAL OBSERVATION PER HR: HCPCS

## 2021-07-23 PROCEDURE — C1874 STENT, COATED/COV W/DEL SYS: HCPCS

## 2021-07-23 PROCEDURE — 80061 LIPID PANEL: CPT

## 2021-07-23 PROCEDURE — 6360000004 HC RX CONTRAST MEDICATION

## 2021-07-23 PROCEDURE — 93005 ELECTROCARDIOGRAM TRACING: CPT | Performed by: INTERNAL MEDICINE

## 2021-07-23 PROCEDURE — C1887 CATHETER, GUIDING: HCPCS

## 2021-07-23 PROCEDURE — 85347 COAGULATION TIME ACTIVATED: CPT

## 2021-07-23 PROCEDURE — 2500000003 HC RX 250 WO HCPCS

## 2021-07-23 PROCEDURE — 6360000002 HC RX W HCPCS

## 2021-07-23 PROCEDURE — C1894 INTRO/SHEATH, NON-LASER: HCPCS

## 2021-07-23 PROCEDURE — 93010 ELECTROCARDIOGRAM REPORT: CPT | Performed by: INTERNAL MEDICINE

## 2021-07-23 PROCEDURE — 85027 COMPLETE CBC AUTOMATED: CPT

## 2021-07-23 PROCEDURE — C1725 CATH, TRANSLUMIN NON-LASER: HCPCS

## 2021-07-23 PROCEDURE — 2709999900 HC NON-CHARGEABLE SUPPLY

## 2021-07-23 PROCEDURE — 6370000000 HC RX 637 (ALT 250 FOR IP): Performed by: INTERNAL MEDICINE

## 2021-07-23 PROCEDURE — C1769 GUIDE WIRE: HCPCS

## 2021-07-23 PROCEDURE — 92928 PRQ TCAT PLMT NTRAC ST 1 LES: CPT

## 2021-07-23 PROCEDURE — 80053 COMPREHEN METABOLIC PANEL: CPT

## 2021-07-23 RX ORDER — HEPARIN SODIUM 1000 [USP'U]/ML
INJECTION, SOLUTION INTRAVENOUS; SUBCUTANEOUS
Status: COMPLETED | OUTPATIENT
Start: 2021-07-23 | End: 2021-07-23

## 2021-07-23 RX ORDER — METOPROLOL SUCCINATE 25 MG/1
25 TABLET, EXTENDED RELEASE ORAL DAILY
Status: DISCONTINUED | OUTPATIENT
Start: 2021-07-24 | End: 2021-07-24 | Stop reason: HOSPADM

## 2021-07-23 RX ORDER — SODIUM CHLORIDE 9 MG/ML
1000 INJECTION, SOLUTION INTRAVENOUS CONTINUOUS
Status: DISCONTINUED | OUTPATIENT
Start: 2021-07-23 | End: 2021-07-23

## 2021-07-23 RX ORDER — FENTANYL CITRATE 50 UG/ML
INJECTION, SOLUTION INTRAMUSCULAR; INTRAVENOUS
Status: COMPLETED | OUTPATIENT
Start: 2021-07-23 | End: 2021-07-23

## 2021-07-23 RX ORDER — ASPIRIN 81 MG/1
81 TABLET, CHEWABLE ORAL DAILY
Status: DISCONTINUED | OUTPATIENT
Start: 2021-07-24 | End: 2021-07-24 | Stop reason: HOSPADM

## 2021-07-23 RX ORDER — EZETIMIBE 10 MG/1
10 TABLET ORAL DAILY
Status: DISCONTINUED | OUTPATIENT
Start: 2021-07-24 | End: 2021-07-24 | Stop reason: HOSPADM

## 2021-07-23 RX ORDER — SODIUM CHLORIDE 9 MG/ML
INJECTION, SOLUTION INTRAVENOUS CONTINUOUS
Status: ACTIVE | OUTPATIENT
Start: 2021-07-23 | End: 2021-07-24

## 2021-07-23 RX ORDER — ACETAMINOPHEN 325 MG/1
650 TABLET ORAL EVERY 4 HOURS PRN
Status: DISCONTINUED | OUTPATIENT
Start: 2021-07-23 | End: 2021-07-24 | Stop reason: HOSPADM

## 2021-07-23 RX ORDER — SODIUM CHLORIDE 0.9 % (FLUSH) 0.9 %
5-40 SYRINGE (ML) INJECTION PRN
Status: DISCONTINUED | OUTPATIENT
Start: 2021-07-23 | End: 2021-07-24 | Stop reason: HOSPADM

## 2021-07-23 RX ORDER — AMLODIPINE BESYLATE 5 MG/1
10 TABLET ORAL DAILY
Status: DISCONTINUED | OUTPATIENT
Start: 2021-07-24 | End: 2021-07-24 | Stop reason: HOSPADM

## 2021-07-23 RX ORDER — SODIUM CHLORIDE 0.9 % (FLUSH) 0.9 %
5-40 SYRINGE (ML) INJECTION EVERY 12 HOURS SCHEDULED
Status: DISCONTINUED | OUTPATIENT
Start: 2021-07-23 | End: 2021-07-24 | Stop reason: HOSPADM

## 2021-07-23 RX ORDER — LEVOTHYROXINE SODIUM 0.15 MG/1
150 TABLET ORAL DAILY
Status: DISCONTINUED | OUTPATIENT
Start: 2021-07-24 | End: 2021-07-24 | Stop reason: HOSPADM

## 2021-07-23 RX ORDER — MIDAZOLAM HYDROCHLORIDE 5 MG/ML
INJECTION INTRAMUSCULAR; INTRAVENOUS
Status: COMPLETED | OUTPATIENT
Start: 2021-07-23 | End: 2021-07-23

## 2021-07-23 RX ORDER — SODIUM CHLORIDE 9 MG/ML
25 INJECTION, SOLUTION INTRAVENOUS PRN
Status: DISCONTINUED | OUTPATIENT
Start: 2021-07-23 | End: 2021-07-24 | Stop reason: HOSPADM

## 2021-07-23 RX ADMIN — MIDAZOLAM HYDROCHLORIDE 2 MG: 5 INJECTION INTRAMUSCULAR; INTRAVENOUS at 10:54

## 2021-07-23 RX ADMIN — HEPARIN SODIUM 1000 UNITS: 1000 INJECTION, SOLUTION INTRAVENOUS; SUBCUTANEOUS at 11:41

## 2021-07-23 RX ADMIN — SODIUM CHLORIDE 1000 ML: 9 INJECTION, SOLUTION INTRAVENOUS at 11:03

## 2021-07-23 RX ADMIN — FENTANYL CITRATE 50 MCG: 50 INJECTION, SOLUTION INTRAMUSCULAR; INTRAVENOUS at 10:54

## 2021-07-23 RX ADMIN — HEPARIN SODIUM 7000 UNITS: 1000 INJECTION, SOLUTION INTRAVENOUS; SUBCUTANEOUS at 11:02

## 2021-07-23 RX ADMIN — TICAGRELOR 90 MG: 90 TABLET ORAL at 20:04

## 2021-07-23 ASSESSMENT — PAIN SCALES - GENERAL
PAINLEVEL_OUTOF10: 0

## 2021-07-23 NOTE — PROCEDURES
administered by qualified nursing personnel under continuous hemodynamic monitoring, starting at 10:52 AM and ending at 11:45 AM.  Medications: 2.5 mg IA verapamil, 3 mg IV Versed, 100 mcg IV Fentanyl, 10,000 units IV heparin, 400 mcg IC nitroglycerin  Contrast: 100 cc of Isovue     Impression:  1. Severe single-vessel coronary artery disease. 2. Successful PCI of proximal LCx into OM1 with overlapping Resolute Alvaro 2.5 x 38 mm and 2.25 x 8 mm SERGIO, tapered to final diameter of ~3 mm. Following deployment and post-dilation of the first SERGIO, repeat angiography showed what appeared to be a small edge dissection at the distal stent edge. Therefore, a second Resolute Alvaro 2.25 x 8 mm SERGIO was deployed in overlapping fashion to cover the small dissection and then the stent balloon was used to dilate the overlap region at high pressure. Final angiography showed <10% residual stenosis with JASON 3 flow with complete coverage of the dissection. Plan:  1. Monitor overnight. 2. Hydrate with IVF. 3. Continue dual antiplatelet therapy with aspirin 81 mg daily and Brilinta 90 mg BID. 4. Start metoprolol succinate 25 mg daily. 5. LVEF was mildly reduced on TTE, but will hold off on starting ACEI for now given recent contrast dye load and fluctuating creatinine. 6. Patient is intolerant to statins. Will continue ezetimibe 10 mg daily and attempt to get approved with PCSK9 inhibitor. 7. Refer for cardiac rehab.  8. Follow-up with Medina Cano in 2 weeks.       Tushar Donald, [facility]

## 2021-07-23 NOTE — H&P
Brief Pre-Op Note/Sedation Assessment      Lou Ascencio  1965  Cath Pool Rm/NONE      [id number]  11:55 AM    Planned Procedure: Cardiac Catheterization Procedure    Post Procedure Plan: Return to same level of care    Consent: I have discussed with the patient and/or the patient representative the indication, alternatives, and the possible risks and/or complications of the planned procedure and the anesthesia methods. The patient and/or patient representative appear to understand and agree to proceed. Chief Complaint: Known CAD, planned PCI of proximal LCx into OM1      Indications for Cath Procedure:  Known CAD, planned PCI of proximal LCx into OM1  Anginal Classification within 2 weeks:  CCS IV - Inability to perform any activity without angina or angina at rest, i.e., severe limitation  NYHA Heart Failure Class within 2 weeks: No symptoms  Is Cath Lab Visit Valve-related?: No  Surgical Risk: Intermediate  Functional Type: < 4 METS    Anti- Anginal Meds within 2 weeks:   Yes: Ca Channel Blockers and Aspirin, intolerant to statins. Stress or Imaging Studies Performed (within 6 months):  None     Vital Signs:  Ht 5' 9\" (1.753 m)   Wt 197 lb 9.6 oz (89.6 kg)   BMI 29.18 kg/m²     Allergies:   Allergies   Allergen Reactions    Penicillins Rash       Past Medical History:  Past Medical History:   Diagnosis Date    Atherosclerosis     Carotid stenosis     Heterozygous factor V Leiden mutation ([address]     History of TIAs     Hypertension          Surgical History:  Past Surgical History:   Procedure Laterality Date    APPENDECTOMY      CAROTID ENDARTERECTOMY Right     CAROTID ENDARTERECTOMY Left 11/19/2020    LEFT CAROTID ENDARTERECTOMY performed by Lotus Denise [facility] at [facility] Bilateral     femoral         Medications:  Current Facility-Administered Medications   Medication Dose Route Frequency Provider Last Rate Last Admin    0.9 % sodium chloride infusion   Intravenous Rinku Abebe DO               Pre-Sedation:    Pre-Sedation Documentation and Exam:  I have personally completed a history, physical exam & review of systems for this patient (see notes). Prior History of Anesthesia Complications:   none    Modified Mallampati:  II (soft palate, uvula, fauces visible)    ASA Classification:  Class 3 - A patient with severe systemic disease that limits activity but is not incapacitating      Faisal Scale: Activity:  2 - Able to move 4 extremities voluntarily on command  Respiration:  2 - Able to breathe deeply and cough freely  Circulation:  2 - BP+/- 20mmHg of normal  Consciousness:  2 - Fully awake  Oxygen Saturation (color):  2 - Able to maintain oxygen saturation >92% on room air    Sedation/Anesthesia Plan:  Guard the patient's safety and welfare. Minimize physical discomfort and pain. Minimize negative psychological responses to treatment by providing sedation and analgesia and maximize the potential amnesia. Patient to meet pre-procedure discharge plan.     Medication Planned:  midazolam intravenously and fentanyl intravenously    Patient is an appropriate candidate for plan of sedation: yes      Electronically signed by Annette Abebe DO on 7/23/2021 at 11:55 AM

## 2021-07-24 VITALS
OXYGEN SATURATION: 98 % | DIASTOLIC BLOOD PRESSURE: 69 MMHG | HEART RATE: 69 BPM | SYSTOLIC BLOOD PRESSURE: 133 MMHG | HEIGHT: 69 IN | BODY MASS INDEX: 29.26 KG/M2 | TEMPERATURE: 98.1 F | WEIGHT: 197.53 LBS | RESPIRATION RATE: 18 BRPM

## 2021-07-24 LAB
ANION GAP SERPL CALCULATED.3IONS-SCNC: 12 MMOL/L (ref 3–16)
BUN BLDV-MCNC: 14 MG/DL (ref 7–20)
CALCIUM SERPL-MCNC: 9.5 MG/DL (ref 8.3–10.6)
CHLORIDE BLD-SCNC: 100 MMOL/L (ref 99–110)
CHOLESTEROL, TOTAL: 187 MG/DL (ref 0–199)
CO2: 23 MMOL/L (ref 21–32)
CREAT SERPL-MCNC: 1.2 MG/DL (ref 0.9–1.3)
EKG ATRIAL RATE: 72 BPM
EKG ATRIAL RATE: 74 BPM
EKG DIAGNOSIS: NORMAL
EKG DIAGNOSIS: NORMAL
EKG P AXIS: 14 DEGREES
EKG P AXIS: 26 DEGREES
EKG P-R INTERVAL: 150 MS
EKG P-R INTERVAL: 150 MS
EKG Q-T INTERVAL: 420 MS
EKG Q-T INTERVAL: 426 MS
EKG QRS DURATION: 90 MS
EKG QRS DURATION: 92 MS
EKG QTC CALCULATION (BAZETT): 466 MS
EKG QTC CALCULATION (BAZETT): 466 MS
EKG R AXIS: 51 DEGREES
EKG R AXIS: 53 DEGREES
EKG T AXIS: -34 DEGREES
EKG T AXIS: -49 DEGREES
EKG VENTRICULAR RATE: 72 BPM
EKG VENTRICULAR RATE: 74 BPM
GFR AFRICAN AMERICAN: >60
GFR NON-AFRICAN AMERICAN: >60
GLUCOSE BLD-MCNC: 100 MG/DL (ref 70–99)
HCT VFR BLD CALC: 46 % (ref 40.5–52.5)
HDLC SERPL-MCNC: 22 MG/DL (ref 40–60)
HEMOGLOBIN: 16.2 G/DL (ref 13.5–17.5)
LDL CHOLESTEROL CALCULATED: 131 MG/DL
MCH RBC QN AUTO: 30 PG (ref 26–34)
MCHC RBC AUTO-ENTMCNC: 35.2 G/DL (ref 31–36)
MCV RBC AUTO: 85.4 FL (ref 80–100)
PDW BLD-RTO: 15.1 % (ref 12.4–15.4)
PLATELET # BLD: 309 K/UL (ref 135–450)
PMV BLD AUTO: 7.6 FL (ref 5–10.5)
POTASSIUM REFLEX MAGNESIUM: 4 MMOL/L (ref 3.5–5.1)
RBC # BLD: 5.39 M/UL (ref 4.2–5.9)
SODIUM BLD-SCNC: 135 MMOL/L (ref 136–145)
TRIGL SERPL-MCNC: 169 MG/DL (ref 0–150)
VLDLC SERPL CALC-MCNC: 34 MG/DL
WBC # BLD: 8.5 K/UL (ref 4–11)

## 2021-07-24 PROCEDURE — 2580000003 HC RX 258: Performed by: INTERNAL MEDICINE

## 2021-07-24 PROCEDURE — G0378 HOSPITAL OBSERVATION PER HR: HCPCS

## 2021-07-24 PROCEDURE — 6370000000 HC RX 637 (ALT 250 FOR IP): Performed by: INTERNAL MEDICINE

## 2021-07-24 PROCEDURE — 80061 LIPID PANEL: CPT

## 2021-07-24 PROCEDURE — 36415 COLL VENOUS BLD VENIPUNCTURE: CPT

## 2021-07-24 PROCEDURE — 93005 ELECTROCARDIOGRAM TRACING: CPT | Performed by: INTERNAL MEDICINE

## 2021-07-24 PROCEDURE — 85027 COMPLETE CBC AUTOMATED: CPT

## 2021-07-24 PROCEDURE — 99238 HOSP IP/OBS DSCHRG MGMT 30/<: CPT | Performed by: NURSE PRACTITIONER

## 2021-07-24 PROCEDURE — 93010 ELECTROCARDIOGRAM REPORT: CPT | Performed by: INTERNAL MEDICINE

## 2021-07-24 PROCEDURE — 80048 BASIC METABOLIC PNL TOTAL CA: CPT

## 2021-07-24 RX ORDER — METOPROLOL SUCCINATE 25 MG/1
25 TABLET, EXTENDED RELEASE ORAL DAILY
Qty: 30 TABLET | Refills: 3 | Status: SHIPPED | OUTPATIENT
Start: 2021-07-24

## 2021-07-24 RX ADMIN — ASPIRIN 81 MG: 81 TABLET, CHEWABLE ORAL at 08:39

## 2021-07-24 RX ADMIN — LEVOTHYROXINE SODIUM 150 MCG: 0.15 TABLET ORAL at 06:23

## 2021-07-24 RX ADMIN — EZETIMIBE 10 MG: 10 TABLET ORAL at 08:39

## 2021-07-24 RX ADMIN — Medication 10 ML: at 08:39

## 2021-07-24 RX ADMIN — TICAGRELOR 90 MG: 90 TABLET ORAL at 08:38

## 2021-07-24 RX ADMIN — AMLODIPINE BESYLATE 10 MG: 5 TABLET ORAL at 08:39

## 2021-07-24 RX ADMIN — METOPROLOL SUCCINATE 25 MG: 25 TABLET, EXTENDED RELEASE ORAL at 08:38

## 2021-07-24 ASSESSMENT — PAIN SCALES - GENERAL
PAINLEVEL_OUTOF10: 0
PAINLEVEL_OUTOF10: 0

## 2021-07-24 NOTE — PROGRESS NOTES
Reviewed discharge instructions with patient, verbalized understanding. Also gave printed material on new medications, care of catheter site and restrictions and when to call the doctor. No additional questions at this time. IV removed with no complications. Telemetry monitor removed and returned, [facility] notified of discharge. Patient instructed to  prescription for metoprolol at outpatient pharmacy. Belongings gathered and lockbox emptied. Patient refused to be escorted out via wheelchair.

## 2021-07-24 NOTE — DISCHARGE INSTR - ACTIVITY
Activity    · Do not do strenuous exercise and do not lift, pull, or push anything heavy until your doctor says it is okay. This may be for a day or two. You can walk around the house and do light activity, such as cooking.     · If the catheter was placed in your groin, try not to walk up stairs for the first couple of days.     · If the catheter was placed in your arm near your wrist, do not bend your wrist deeply for the first couple of days. Be careful using your hand to get into and out of a chair or bed.     · If your doctor recommends it, get more exercise. Walking is a good choice. Bit by bit, increase the amount you walk every day. Try for at least 30 minutes on most days of the week.

## 2021-07-24 NOTE — PROGRESS NOTES
BP substantially elevated, prior to receiving scheduled morning BP meds. BP taken in multiple limbs, to confirm accuracy. Gave metoprolol 25mg and amlodipine 10mg.  Will reevaluate in 1 hour

## 2021-07-24 NOTE — DISCHARGE SUMMARY
[facility]  Discharge Summary  Patient ID:  Pepe Arriaga  [id number] y.o. 1965    Admit date: 7/23/2021    Discharge date: 7/24/2021    Admitting Physician: Frank Hermosillo DO     Discharge Provider: Magdy Francis [facility] Zbigniew Gonzalez Course: Pepe Arriaga presented 7/23/2021 as an outpatient for staged PCI resulting in SERGIO x2 LCx/OM1. No post procedure complications, rhythm overnight has been sinus. This morning he denies chest pain, shortness of breath, palpitations and dizziness. He has tolerated diet, ambulated, and voided without difficulty. Assessment:  CAD: no current angina; s/p SERGIO x2 LCx/OM1 7/23/2021; s/p SERGIO x2 RCA in the setting of NSTEMI 7/13/2021  Ischemic cardiomyopathy: EF 40-45%  NSVT: appears resolved  Carotid stenosis: s/p R carotid stent and L CEA 11/2020  L subclavian stenosis  HTN: stable  HLD: intolerant to statins, continue zetia, PCSK9 referral  Factor V  Tobacco abuse: counseled    Plan:  1. Activity restrictions reviewed  2. Continue aspirin, toprol, zetia, brilinta, norvasc; statin intolerant, PCSK9 referral  3. Holding lisinopril due to fluctuating creatinine and contrast exposure, recommend in follow up if renal function stable  4. Check BP at home and call the office if consistently out of goal range  5.  Follow up 8/5/2021    Admission Diagnoses: Atherosclerotic heart disease of native coronary artery without angina pectoris [I25.10]  CAD in native artery [I25.10]  CAD in native artery [I25.10]  CAD S/P percutaneous coronary angioplasty [I25.10, Z98.61]    Discharge Diagnoses:   Patient Active Problem List   Diagnosis    Stenosis of left carotid artery    History of right common carotid artery stent placement    Carotid stenosis, left    [facility]STEMI (non-ST elevated myocardial infarction) [facility]    Essential hypertension    Acute kidney injury ([facility] [address]    NSVT (nonsustained ventricular tachycardia) (HCC)    PVD (peripheral vascular disease) ([facility] [address]    Carotid artery disease ([facility] [address]    Stenosis of left subclavian artery (HCC)    Tobacco use    Heterozygous factor V Leiden mutation ([facility]    CAD in native artery    CAD S/P percutaneous coronary angioplasty      Discharged Condition: stable  The patient was seen and examined on day of discharge and this discharge summary is in conjunction with any daily progress note from day of discharge. Consults:  IP CONSULT TO CARDIAC REHAB  Physical Exam:  BP (!) 126/91   Pulse 71   Temp 98 °F (36.7 °C) (Oral)   Resp 18   Ht 5' 9\" (1.753 m)   Wt 197 lb 8.5 oz (89.6 kg)   SpO2 95%   BMI 29.17 kg/m²       Intake/Output Summary (Last 24 hours) at 7/24/2021 8531  Last data filed at 7/23/2021 234  Gross per 24 hour   Intake 240 ml   Output --   Net 240 ml     General:  Awake, alert, NAD  Skin:  Warm and dry  Neck:  JVD<8, no bruit  Chest:  Clear to auscultation, no wheezes/rhonchi/rales  Cardiovascular:  RRR S1S2  Abdomen:  Soft, nontender, +bowel sounds  Extremities:  No edema  Right radial site soft, no hematoma, 2+ pulse    Significant Diagnostic Studies:     Coronary angiogram 7/23/2021:  Primary Indication: Known CAD, planned PCI to proximal LCx into OM1  Procedures Performed:  1. Selective left coronary angiography  2. PCI of proximal LCx into OM1 with 2 overlapping SERGIO  3. Moderate conscious sedation  Procedural Details:  1. Access: Local anesthetic was given and access was obtained in the right radial artery using a micropuncture technique and a 6F [place]umo Slender Sheath was placed without difficulty. 2. Diagnostic: A 6F XB3.0 guide catheter was used to perform selective left coronary angiography. The right coronary artery was not re-studied. 3. Intervention (PCI of proximal LCx into OM1): Therapeutic ACT was achieved with the administration of IV heparin. Using a 6F XB3.0 guide catheter, a 0.014 [place] wire was advanced into the distal OM1.   The mid-OM1 back to the proximal LCx was pre-dilated with a non-compliant 2.5 x 20 mm balloon. The proximal LCx into the OM1 was then stented with a Resolute [place] 2.5 x 38 mm SERGIO. The stent was post-dilated with a non-compliant 2.5 x 20 mm balloon at high pressure and then the proximal portion of the stent was dilated again with a non-compliant 3.0 x 15 mm balloon at nominal pressure. Repeat angiography showed what appeared to be a small edge dissection at the distal stent edge. Therefore, a second Resolute [place] 2.25 x 8 mm SERGIO was deployed in overlapping fashion to cover the small dissection and then the stent balloon was used to dilate the overlap region at high pressure. Final angiography showed <10% residual stenosis with JASON 3 flow with complete coverage of the dissection. 4. Hemostasis: At the end of the procedure, the radial sheath was removed and a hemoband was placed over the arteriotomy site and filled with air to maintain hemostasis. Findings:  1. Hemodynamics:              A. Opening arterial pressure: 151/86 (112) mmHg  2. Coronary anatomy:  A. Left main artery: The left main artery bifurcates into the left anterior descending artery and left circumflex artery.  The left main artery has minor luminal irregularities. B. Left anterior descending artery: Transapical vessel which gives rise to one diagonal artery.  The LAD has a 40-50% ostial stenosis, sequential 30% and 40% mid-vessel stenoses, and a 30$ distal stenosis.  The diagonal artery is a small-medium caliber branching vessel with mild-moderate diffuse disease.   C. Left circumflex artery: Non-dominant vessel that gives rise to 2 obtuse marginal arteries.  The proximal LCx has sequential 30% and 70% stenoses.  The mid-LCx has a 40% stenosis immediately after the bifurcation of the OM1.  The AV groove continuation is small and has mild disease.  The OM1 is a large branching vessel with sequential 80% and 90% proximal stenoses and a 60% mid-vessel stenosis.  The first branch of the OM1 is small and has an 80% ostial stenosis followed by a long 50% proximal to mid-vessel stenosis.  The OM2 is small and has a 40% ostial stenosis and 50% distal stenosis.   D. Right coronary artery: Known to be dominant vessel. The RCA was not re-studied. Results of Intervention (PCI of proximal LCx into OM1):  Pre - 90% stenosis, JASON 3 flow  Post - <10% stenosis, JASON 3 flow  Impression:  1. Severe single-vessel coronary artery disease. 2. Successful PCI of proximal LCx into OM1 with overlapping Resolute [place] 2.5 x 38 mm and 2.25 x 8 mm SERGIO, tapered to final diameter of ~3 mm. Following deployment and post-dilation of the first SERGIO, repeat angiography showed what appeared to be a small edge dissection at the distal stent edge. Therefore, a second Resolute [place] 2.25 x 8 mm SERGIO was deployed in overlapping fashion to cover the small dissection and then the stent balloon was used to dilate the overlap region at high pressure. Final angiography showed <10% residual stenosis with JASON 3 flow with complete coverage of the dissection. Plan:  1. Monitor overnight. 2. Hydrate with IVF. 3. Continue dual antiplatelet therapy with aspirin 81 mg daily and Brilinta 90 mg BID. 4. Start metoprolol succinate 25 mg daily. 5. LVEF was mildly reduced on TTE, but will hold off on starting ACEI for now given recent contrast dye load and fluctuating creatinine. 6. Patient is intolerant to statins.  Will continue ezetimibe 10 mg daily and attempt to get approved with PCSK9 inhibitor. 7. Refer for cardiac rehab.  8. Follow-up with Medina Cnao in 2 weeks.     Echo 7/13/2021:   Technically difficult examination.  Lexy Meyers left ventricular systolic function is mildly reduced with an ejection   fraction of 40-45%.   There is septal hypokinesis.   There is mild concentric left ventricular hypertrophy.   Grade I diastolic dysfunction with normal left ventricular filling pressure.   Mild mitral regurgitation.     Coronary angiogram 7/13/2021:  Primary Indication: NSTEMI  Procedures Performed:  1. Coronary angiography  2. Left heart catheterization  3. PCI of proximal to distal RCA with 2 overlapping SERIGO  4. Moderate conscious sedation  Results of Intervention (PCI of proximal to distal RCA):  Pre - 95% stenosis, JASON 2 flow  Post - <10% stenosis, JASON 3 flow  Impression:  1. NSTEMI. 2. Severe two-vessel coronary artery disease with proximal RCA culprit lesion. 3. Successful PCI of proximal to distal RCA with overlapping Resolute [place] 2.25 x 30 mm and 2.5 x 38 mm SERGIO, tapered to final diameter of ~3.11 mm.  4. Normal LVEDP. 5. Will planned for staged PCI of proximal LCx/OM1 in 1-2 weeks. Plan:  1. Monitor overnight. 2. Hydrate with IVF. 3. Brilinta 90 mg BID for at least one year. 4. Aspirin 81 mg daily indefinitely. 5. Patient is intolerant to statins.  Will continue ezetimibe 10 mg daily and attempt to get approved with PCSK9 inhibitor. 6. Hold off on beta-blocker due to baseline bradycardia. 7. TTE pending for formal evaluation of cardiac structure and function. 8. Plan for staged PCI fo proximal LCx/OM1 in 1-2 weeks.     Labs:   Lab Results   Component Value Date    CREATININE 1.2 07/24/2021    BUN 14 07/24/2021     (L) 07/24/2021    K 4.0 07/24/2021     07/24/2021    CO2 23 07/24/2021      Lab Results   Component Value Date    WBC 8.5 07/24/2021    HGB 16.2 07/24/2021    HCT 46.0 07/24/2021    MCV 85.4 07/24/2021     07/24/2021    No results found for: INR, PROTIME No results found for: BNP    Disposition: home    Patient Instructions:    Dennis Monterroso   Home Medication Instructions BBO:[id number]    Printed on:07/24/21 7397   Medication Information                      amLODIPine (NORVASC) 10 MG tablet  Take 10 mg by mouth daily             aspirin 81 MG chewable tablet  Take 1 tablet by mouth daily             ezetimibe (ZETIA) 10 MG tablet  TAKE ONE TABLET BY MOUTH ONCE DAILY             levothyroxine (SYNTHROID) 150 MCG tablet  TAKE ONE TABLET BY MOUTH AT BEDTIME ON AN EMPTY STOMACH FOR THYROID             metoprolol succinate (TOPROL XL) 25 MG extended release tablet  Take 1 tablet by mouth daily             ticagrelor (BRILINTA) 90 MG TABS tablet  Take 1 tablet by mouth 2 times daily               Activity: no heavy lifting for 1 week  Diet: cardiac diet  Wound Care: keep wound clean and dry    Follow-up on 8/5/2021    Signed:  QUENTIN Valentine CNP, 7/24/2021, 8:34 AM

## 2021-08-05 ENCOUNTER — TELEPHONE (OUTPATIENT)
Dept: CARDIOLOGY CLINIC | Age: 56
End: 2021-08-05

## 2021-08-05 ENCOUNTER — OFFICE VISIT (OUTPATIENT)
Dept: CARDIOLOGY CLINIC | Age: 56
End: 2021-08-05
Payer: OTHER GOVERNMENT

## 2021-08-05 VITALS
HEIGHT: 69 IN | DIASTOLIC BLOOD PRESSURE: 78 MMHG | HEART RATE: 81 BPM | SYSTOLIC BLOOD PRESSURE: 146 MMHG | WEIGHT: 200 LBS | OXYGEN SATURATION: 98 % | BODY MASS INDEX: 29.62 KG/M2

## 2021-08-05 DIAGNOSIS — I25.10 CAD S/P PERCUTANEOUS CORONARY ANGIOPLASTY: Primary | ICD-10-CM

## 2021-08-05 DIAGNOSIS — Z98.61 CAD S/P PERCUTANEOUS CORONARY ANGIOPLASTY: Primary | ICD-10-CM

## 2021-08-05 DIAGNOSIS — I65.23 BILATERAL CAROTID ARTERY STENOSIS: ICD-10-CM

## 2021-08-05 DIAGNOSIS — I21.4 NSTEMI (NON-ST ELEVATED MYOCARDIAL INFARCTION) (HCC): ICD-10-CM

## 2021-08-05 DIAGNOSIS — I50.22 CHRONIC SYSTOLIC HEART FAILURE (HCC): ICD-10-CM

## 2021-08-05 DIAGNOSIS — E78.5 HYPERLIPIDEMIA, UNSPECIFIED HYPERLIPIDEMIA TYPE: ICD-10-CM

## 2021-08-05 DIAGNOSIS — I10 ESSENTIAL HYPERTENSION: ICD-10-CM

## 2021-08-05 DIAGNOSIS — D68.51 HETEROZYGOUS FACTOR V LEIDEN MUTATION (HCC): ICD-10-CM

## 2021-08-05 DIAGNOSIS — Z72.0 TOBACCO USE: ICD-10-CM

## 2021-08-05 DIAGNOSIS — I25.5 ISCHEMIC CARDIOMYOPATHY: ICD-10-CM

## 2021-08-05 DIAGNOSIS — I73.9 PVD (PERIPHERAL VASCULAR DISEASE) (HCC): ICD-10-CM

## 2021-08-05 PROCEDURE — 99214 OFFICE O/P EST MOD 30 MIN: CPT | Performed by: INTERNAL MEDICINE

## 2021-08-05 PROCEDURE — 1111F DSCHRG MED/CURRENT MED MERGE: CPT | Performed by: INTERNAL MEDICINE

## 2021-08-05 RX ORDER — HYDRALAZINE HYDROCHLORIDE 25 MG/1
25 TABLET, FILM COATED ORAL 3 TIMES DAILY
Qty: 270 TABLET | Refills: 3 | Status: SHIPPED | OUTPATIENT
Start: 2021-08-05

## 2021-08-05 NOTE — PROGRESS NOTES
[address] [facility]   Cardiovascular Evaluation    PATIENT: Mitchell Shannon  DATE: 2021  MRN: [id number]  CSN: [id number]  : 1965      Primary Care Doctor: Layla Gee  Reason for evaluation:   Follow-up for CAD    Subjective:     Jairon Rivera is a 76-year-old male with a history of CAD, ischemic cardiomyopathy, TIA, PVD, bilateral carotid artery disease s/p right carotid artery stenting and left carotid artery CEA, asymptomatic left subclavian artery stenosis, heterozygous factor V Leiden mutation, essential hypertension, hyperlipidemia, and tobacco use who presents for follow-up. The patient was admitted to [facility] from -21 with an NSTEMI and underwent invasive coronary angiography which demonstrated severe 2-vessel coronary artery disease involving the RCA and proximal LCx/OM1. The LAD had moderate non-obstructive disease. At that time, he underwent PCI of the proximal to distal RCA with overlapping Resolute [place] 2.25 x 30 mm and 2.5 x 38 mm SERGIO, tapered to final diameter of ~3.11 mm. He then returned on 21 for staged PCI of his proximal LCx into the OM1 with overlapping   Resolute [place] 2.5 x 38 mm and 2.25 x 8 mm SERGIO, tapered to final diameter of ~3 mm. (Following deployment and post-dilation of the   first SERGIO, repeat angiography showed what appeared to be a small edge dissection at the distal stent edge.  Therefore, a second   Resolute [place] 2.25 x 8 mm SERGIO was deployed in overlapping fashion to cover the small dissection.)  TTE from 21 showed an LVEF of 40-45% with septal hypokinesis, mild concentric LVH, grade 1 diastolic dysfunction, normal RV size and systolic function, and mild mitral regurgitation.     Since his intervention, the patient reports that he has generally been feeling well and denies any recurrent chest pain, shortness of breath, palpitations, lightheadedness, dizziness, lower extremity edema, orthopnea, or paroxysmal nocturnal dyspnea. He is still smoking, but has cut back to 1 ppd. He has been compliant with his dual antiplatelet therapy. He says that he recently had labs performed at the [facility] and was told that his creatinine was mildly elevated. Patient Active Problem List   Diagnosis    Stenosis of left carotid artery    History of right common carotid artery stent placement    Carotid stenosis, left    [facility]STEM[facility]non-ST elevated [facility] infarction) [facility]    Essential hypertension    Acute kidney injury ([facility]    NSVT (nonsustained ventricular tachycardia) ([facility]    PVD (peripheral vascular disease) ([facility]    Carotid artery disease ([facility]    Stenosis of left subclavian artery ([facility]    Tobacco use    [facility]    CAD in native artery    CAD S/P percutaneous coronary angioplasty         Past Medical History:   has a past medical history of Atherosclerosis, Carotid stenosis, Heterozygous factor V Leiden mutation ([facility] History of TIAs, and Hypertension. Surgical History:   has a past surgical history that includes Carotid endarterectomy (Right); vascular surgery (Bilateral); Appendectomy; and Carotid endarterectomy (Left, 11/19/2020). Social History:   reports that he has been smoking cigarettes. He has a 45.00 pack-year smoking history. He has never used smokeless tobacco. He reports current alcohol use. He reports that he does not use drugs. Family History:  Family History   Problem Relation Age of Onset    Other Mother         Factor V Leiden    Heart Attack Father        Home Medications:  Reviewed and are listed in nursing record.  and/or listed below  Current Outpatient Medications   Medication Sig Dispense Refill    metoprolol succinate (TOPROL XL) 25 MG extended release tablet Take 1 tablet by mouth daily 30 tablet 3    aspirin 81 MG chewable tablet Take 1 tablet by mouth daily 30 tablet 3    ticagrelor (BRILINTA) 90 MG TABS tablet Take 1 tablet by mouth 2 times daily 60 tablet 11    levothyroxine (SYNTHROID) 150 MCG tablet TAKE ONE TABLET BY MOUTH AT BEDTIME ON AN EMPTY STOMACH FOR THYROID      ezetimibe (ZETIA) 10 MG tablet TAKE ONE TABLET BY MOUTH ONCE DAILY      amLODIPine (NORVASC) 10 MG tablet Take 10 mg by mouth daily       No current facility-administered medications for this visit. Allergies:  Penicillins     Review of Systems:   Review of Systems   Constitutional: Negative for chills and fever. HENT: Negative for congestion, rhinorrhea and sore throat. Eyes: Negative for photophobia, pain and visual disturbance. Respiratory: Negative for cough and shortness of breath. Cardiovascular: Negative for chest pain, palpitations and leg swelling. Gastrointestinal: Negative for abdominal pain, blood in stool, constipation, diarrhea, nausea and vomiting. Endocrine: Negative for cold intolerance and heat intolerance. Genitourinary: Negative for difficulty urinating, dysuria and hematuria. Musculoskeletal: Negative for arthralgias, joint swelling and myalgias. Skin: Negative for rash and wound. Allergic/Immunologic: Negative for environmental allergies and food allergies. Neurological: Negative for dizziness, syncope and light-headedness. Hematological: Negative for adenopathy. Does not bruise/bleed easily. Psychiatric/Behavioral: Negative for dysphoric mood. The patient is not nervous/anxious. Objective:   PHYSICAL EXAM:    Vitals:    08/05/21 1527   BP: 160/90 and  146/78 on repeat   Pulse: 81   SpO2: 98%    Weight: 200 lb (90.7 kg)     Wt Readings from Last 3 Encounters:   08/05/21 200 lb (90.7 kg)   07/24/21 197 lb 8.5 oz (89.6 kg)   07/14/21 199 lb 8 oz (90.5 kg)      General: Adult male in no acute distress. Pleasant and interactive on exam.  HEENT: Normocephalic, atraumatic, non-icteric, hearing intact, nares normal, mucous membranes moist.  Neck: No JVD. Heart: Regular rate and rhythm. Normal S1 and S2.  No murmurs, gallops or rubs. Lungs: Normal respiratory effort. Breath sounds mildly diminished bilaterally. No wheezes, rales, or rhonchi. Abdomen: Soft, non-tender. Normoactive bowel sounds. No masses or organomegaly. Skin: No rashes, wounds, or lesions. Pulses: 2+ right radial artery pulse. Left radial artery pulse is diminished. Extremities: No clubbing, cyanosis, or edema. Psych: Normal mood and affect. Neuro: Alert and oriented to person, place, and time. No focal deficits noted.       LABS   CBC:      Lab Results   Component Value Date    WBC 8.5 07/24/2021    RBC 5.39 07/24/2021    HGB 16.2 07/24/2021    HCT 46.0 07/24/2021    MCV 85.4 07/24/2021    RDW 15.1 07/24/2021     07/24/2021     CMP:  Lab Results   Component Value Date     07/24/2021    K 4.0 07/24/2021     07/24/2021    CO2 23 07/24/2021    BUN 14 07/24/2021    CREATININE 1.2 07/24/2021    GFRAA >60 07/24/2021    AGRATIO 1.1 07/23/2021    LABGLOM >60 07/24/2021    GLUCOSE 100 07/24/2021    PROT 8.7 07/23/2021    CALCIUM 9.5 07/24/2021    BILITOT 0.4 07/23/2021    ALKPHOS 73 07/23/2021    AST 15 07/23/2021    ALT 20 07/23/2021     PT/INR:   No results found for: PTINR  Liver:  No components found for: CHLPL  Lab Results   Component Value Date    ALT 20 07/23/2021    AST 15 07/23/2021    ALKPHOS 73 07/23/2021    BILITOT 0.4 07/23/2021     No results found for: LABA1C  Lipids:         Lab Results   Component Value Date    TRIG 169 (H) 07/24/2021    TRIG 179 (H) 07/23/2021    TRIG 250 (H) 07/14/2021            Lab Results   Component Value Date    HDL 22 (L) 07/24/2021    HDL 23 (L) 07/23/2021    HDL 23 (L) 07/14/2021            Lab Results   Component Value Date    LDLCALC 131 (H) 07/24/2021    LDLCALC 138 (H) 07/23/2021    LDLCALC 128 (H) 07/14/2021            Lab Results   Component Value Date    LABVLDL 34 07/24/2021    LABVLDL 36 07/23/2021    LABVLDL 50 07/14/2021         CARDIAC DATA     LAST EKG 7/24/21:  Normal sinus rhythm, inferior T wave abnormality, mildly prolonged QTc.    ECHO:  TTE 7/13/21:  Summary   Technically difficult examination. The left ventricular systolic function is mildly reduced with an ejection   fraction of 40-45%. There is septal hypokinesis. There is mild concentric left ventricular hypertrophy. Grade I diastolic dysfunction with normal left ventricular filling pressure. Mild mitral regurgitation. STRESS TEST: N/A    CATH:  [facility] 7/13/21:  Findings:   1. Hemodynamics:      A. Opening arterial pressure: 130/74 (96) mmHg       B. LVEDP: 9 mmHg     2.  Coronary anatomy:   A. Left main artery: The left main artery bifurcates into the   left anterior descending artery and left circumflex artery.  The   left main artery has minor luminal irregularities. B. Left anterior descending artery: Transapical vessel which   gives rise to one diagonal artery.  The LAD has a 40-50% ostial   stenosis, sequential 30% and 40% mid-vessel stenoses, and a 30$   distal stenosis.  The diagonal artery is a small-medium caliber   branching vessel with mild-moderate diffuse disease. C. Left circumflex artery: Non-dominant vessel that gives rise to   2 obtuse marginal arteries.  The proximal LCx has sequential 30%   and 70% stenoses.  The mid-LCx has a 40% stenosis immediately   after the bifurcation of the OM1.  The AV groove continuation is   small and has mild disease.  The OM1 is a large branching vessel   with sequential 80% and 90% proximal stenoses and a 60%   mid-vessel stenosis.  The first branch of the OM1 is small and   has an 80% ostial stenosis followed by a long 50% proximal to   mid-vessel stenosis.  The OM2 is small and has a 40% ostial   stenosis and 50% distal stenosis. D. Right coronary artery: Dominant vessel.  The RCA is diffusely   diseased with 95% proximal, 80% mid-vessel, and 80% distal   stenoses.  The RPDA has minor luminal irregularities.      Results of Intervention (PCI of proximal to distal RCA):   Pre - 95% stenosis, JASON 2 flow   Post - <10% stenosis, JASON 3 flow     [facility] 7/23/21:  Findings:   1. Hemodynamics:      A. Opening arterial pressure: 151/86 (112) mmHg     2.  Coronary anatomy:   A. Left main artery: The left main artery bifurcates into the   left anterior descending artery and left circumflex artery.  The   left main artery has minor luminal irregularities. B. Left anterior descending artery: Transapical vessel which   gives rise to one diagonal artery.  The LAD has a 40-50% ostial   stenosis, sequential 30% and 40% mid-vessel stenoses, and a 30$   distal stenosis.  The diagonal artery is a small-medium caliber   branching vessel with mild-moderate diffuse disease. C. Left circumflex artery: Non-dominant vessel that gives rise to   2 obtuse marginal arteries.  The proximal LCx has sequential 30%   and 70% stenoses.  The mid-LCx has a 40% stenosis immediately   after the bifurcation of the OM1.  The AV groove continuation is   small and has mild disease.  The OM1 is a large branching vessel   with sequential 80% and 90% proximal stenoses and a 60%   mid-vessel stenosis.  The first branch of the OM1 is small and   has an 80% ostial stenosis followed by a long 50% proximal to   mid-vessel stenosis.  The OM2 is small and has a 40% ostial   stenosis and 50% distal stenosis. D. Right coronary artery: Known to be dominant vessel.  The RCA   was not re-studied. Results of Intervention (PCI of proximal LCx into OM1):   Pre - 90% stenosis, JASON 3 flow   Post - <10% stenosis, JASON 3 flow          Assessment:     1. CAD - S/p NSTEMI and PCI of proximal to distal RCA with overlapping Resolute [place] 2.25 x 30 mm and 2.5 x 38 mm SERGIO, tapered to final diameter of ~3.11 mm on 7/13/21. He then underwent staged PCI of his proximal LCx into the OM1 on 7/23/21 with overlapping   Resolute [place] 2.5 x 38 mm and 2.25 x 8 mm SERGIO, tapered to final diameter of ~3 mm.   (Following deployment and post-dilation of the   first SERGIO, repeat angiography showed what appeared to be a small edge dissection at the distal stent edge.  Therefore, a second   Resolute [place] 2.25 x 8 mm SERGIO was deployed in overlapping fashion to cover the small dissection.)  Overall, he appears to be recovering well from his recent interventions and denies recurrent angina. 2. LV systolic heart failure/ischemic cardiomyopathy - TTE from 7/13/21 showed an LVEF of 40-45% with septal hypokinesis. Currently appears clinically euvolemic. 3. Bilateral carotid artery disease - S/p right carotid artery stenting and left carotid artery CEA. 4. Essential hypertension - BP elevated today in clinic at 160/90 and then 146/78 on repeat. 5. Asymptomatic left subclavian artery stenosis  6. Hyperlipidemia  7. MALCOLM  8. TIA  9. Heterozygous factor V Leiden mutation  10. Tobacco use    Plan:     1. Overall, patient appears to be recovering well form his recent PCIs, denies recurrent angina, and appears well compensated from a heart failure standpoint. 2. Recommended referral to cardiac rehab, but patient is unsure whether he will be able to participate due to his current work schedule. 3. Will continue dual antiplatelet therapy with aspirin 81 mg daily and Brilinta 90 mg BID. Brilinta should be continued through at least 7/13/21. 4. Patient is intolerant to statins and will attempt to get approved for Repatha through [place]. 5. BP elevated today in clinic as above. Will start hydralazine 25 mg TID and titrate as needed to achieve goal BP <130/80. Would ideally like to start ACEI given reduced LVEF and wean off calcium-channel blocker, but given reports of recent elevated creatinine on labs performed at [place], will hold off until outside records are obtained to confirm kidney function. 6. Continue metoprolol succinate 25 mg daily, ezetimibe 10 mg daily, and amlodipine 10 mg daily for now.   7. Again reviewed risks of continued tobacco use and strongly encouraged smoking cessation. Patient is not interested in nicotine replacement therapy. He is a  and is concerned about potential for false positive urine drug screen with [facility], and plans to continue working to cut back on his own. 8. Follow-up in 3 months. Margarita's attestation: This note was scribed in the presence of [address] [place] [address] DO Andrae by Angela Clarke RN. It is a pleasure to assist in the care of Pepe Arriaga. Please call with any questions. The scribes documentation has been prepared under my direction and personally reviewed by me in its entirety. I confirm that the note above accurately reflects all work, treatment, procedures, and medical decision making performed by me. I, Dr. Frank Hermosillo, personally performed the services described in this documentation as scribed by Angela Clarke RN in my presence, and it is both accurate and complete to the best of our ability.         Cielo Lazaro DO  [facility]  [phone] [facility]  [phone] [facility]

## 2021-08-05 NOTE — PATIENT INSTRUCTIONS
Patient Plan:  1. Continue current medications  2. Will obtain labs from [place] in [facility]. Discussed switching from amlodipine to lisinopril. Check BP 2-3 times a week. Keep a log to bring on follow up  3. Will start process for repatha approval  4. Smoking cessation discussed  5. Cardiac rehab referral. He is unable to participate due to work schedule  6. Increase activity with walking at a leisurely pace multiple times a week up to 30 minutes daily  7.  Follow up in 3 months

## 2021-08-08 ASSESSMENT — ENCOUNTER SYMPTOMS
CONSTIPATION: 0
ABDOMINAL PAIN: 0
BLOOD IN STOOL: 0
SORE THROAT: 0
COUGH: 0
NAUSEA: 0
RHINORRHEA: 0
VOMITING: 0
EYE PAIN: 0
PHOTOPHOBIA: 0
SHORTNESS OF BREATH: 0
DIARRHEA: 0

## 2021-08-09 NOTE — TELEPHONE ENCOUNTER
Most recent labs have been received from [place], put in MD's folder to be scanned and abstracted by either MA/RN.        TY

## 2021-08-12 RX ORDER — ALIROCUMAB 75 MG/ML
75 INJECTION, SOLUTION SUBCUTANEOUS
Qty: 6 PEN | Refills: 3 | Status: SHIPPED | OUTPATIENT
Start: 2021-08-12

## 2021-08-12 NOTE — TELEPHONE ENCOUNTER
[facility] called regarding pt's Repatha injectable 140 mg/ml SOAJ. [place] [facility] is not on their formulary. The [facility] is wanting to know if they can give Praulent instead which is on their formulary. [facility] stated they had called and left a vm on 8/9 but nothing showing in chart. They would like to get a response on this asap.  [facility] can be reached @ [phone] ext [phone]

## 2021-08-13 NOTE — TELEPHONE ENCOUNTER
Called and informed pharmacy that RX sent. They confirmed they received it. They stated that it is \"not (their) responsibility to instruct the patient on use of praluent\". Instructed pharmacy to inform patient that we would be happy to assist with any teachings if needed.

## 2021-11-08 ASSESSMENT — ENCOUNTER SYMPTOMS
VOMITING: 0
DIARRHEA: 0
SORE THROAT: 0
ABDOMINAL PAIN: 0
NAUSEA: 0
CONSTIPATION: 0
BLOOD IN STOOL: 0
RHINORRHEA: 0
SHORTNESS OF BREATH: 0
COUGH: 0
EYE PAIN: 0
PHOTOPHOBIA: 0

## 2021-11-08 NOTE — PROGRESS NOTES
or paroxysmal nocturnal dyspnea. He has been compliant with his dual antiplatelet therapy and other medications. He is unfortunately still smoking one pack of cigarettes per day. Patient Active Problem List   Diagnosis    Stenosis of left carotid artery    History of right common carotid artery stent placement    Carotid stenosis, left    NSTEMI (non-ST elevated myocardial infarction) ([facility]    Essential hypertension    [facility]    NSVT (nonsustained ventricular tachycardia) [facility]    PVD (peripheral vascular disease) ([facility]    Carotid artery disease ([facility]    Stenosis of left subclavian artery ([facility]    Tobacco use    Heterozygous factor V Leiden mutation ([facility]    CAD in native artery    CAD S/P percutaneous coronary angioplasty         Past Medical History:   has a past medical history of Atherosclerosis, Carotid stenosis, Heterozygous factor V Leiden mutation ([facility] History of TIAs, and Hypertension. Surgical History:   has a past surgical history that includes Carotid endarterectomy (Right); vascular surgery (Bilateral); Appendectomy; and Carotid endarterectomy (Left, 11/19/2020). Social History:   reports that he has been smoking cigarettes. He has a 45.00 pack-year smoking history. He has never used smokeless tobacco. He reports current alcohol use. He reports that he does not use drugs. Family History:  Family History   Problem Relation Age of Onset    Other Mother         Factor V Leiden    Heart Attack Father        Home Medications:  Reviewed and are listed in nursing record.  and/or listed below  Current Outpatient Medications   Medication Sig Dispense Refill    alirocumab (PRALUENT) 75 MG/ML SOAJ injection pen Inject 1 mL into the skin every 14 days 6 pen 3    Evolocumab 140 MG/ML SOAJ Inject 140 mg into the skin every 14 days 10 pen 3    hydrALAZINE (APRESOLINE) 25 MG tablet Take 1 tablet by mouth 3 times daily 270 tablet 3    metoprolol succinate (TOPROL XL) 25 MG extended release tablet Take 1 tablet by mouth daily 30 tablet 3    aspirin 81 MG chewable tablet Take 1 tablet by mouth daily 30 tablet 3    ticagrelor (BRILINTA) 90 MG TABS tablet Take 1 tablet by mouth 2 times daily 60 tablet 11    levothyroxine (SYNTHROID) 150 MCG tablet TAKE ONE TABLET BY MOUTH AT BEDTIME ON AN EMPTY STOMACH FOR THYROID      ezetimibe (ZETIA) 10 MG tablet TAKE ONE TABLET BY MOUTH ONCE DAILY      amLODIPine (NORVASC) 10 MG tablet Take 10 mg by mouth daily       No current facility-administered medications for this visit. Allergies:  Penicillins and Statins     Review of Systems:   Review of Systems   Constitutional: Negative for chills and fever. HENT: Negative for congestion, rhinorrhea and sore throat. Eyes: Negative for photophobia, pain and visual disturbance. Respiratory: Negative for cough and shortness of breath. Cardiovascular: Negative for chest pain, palpitations and leg swelling. Gastrointestinal: Negative for abdominal pain, blood in stool, constipation, diarrhea, nausea and vomiting. Endocrine: Negative for cold intolerance and heat intolerance. Genitourinary: Negative for difficulty urinating, dysuria and hematuria. Musculoskeletal: Negative for arthralgias, joint swelling and myalgias. Skin: Negative for rash and wound. Allergic/Immunologic: Negative for environmental allergies and food allergies. Neurological: Negative for dizziness, syncope and light-headedness. Hematological: Negative for adenopathy. Does not bruise/bleed easily. Psychiatric/Behavioral: Negative for dysphoric mood. The patient is not nervous/anxious.         Objective:   PHYSICAL EXAM:    Vitals:    11/09/21 0908   BP: (!) 140/80 and 146/88 on repeat   Pulse: 95   SpO2: 98% on room air      Weight: 203 lb 8 oz (92.3 kg)     Wt Readings from Last 3 Encounters:   11/09/21 203 lb 8 oz (92.3 kg)   08/05/21 200 lb (90.7 kg)   07/24/21 197 lb 8.5 oz (89.6 kg) General: Adult male in no acute distress. Pleasant and interactive on exam.  HEENT: Normocephalic, atraumatic, non-icteric, hearing intact, nares normal, mucous membranes moist.  Neck: No JVD. Heart: Regular rate and rhythm. Normal S1 and S2. No murmurs, gallops or rubs. Lungs: Normal respiratory effort. Breath sounds mildly diminished bilaterally. No wheezes, rales, or rhonchi. Abdomen: Soft, non-tender. Normoactive bowel sounds. No masses or organomegaly. Skin: No rashes, wounds, or lesions. Pulses: 2+ right radial artery pulse. Left radial artery pulse is diminished. Extremities: No clubbing, cyanosis, or edema. Psych: Normal mood and affect. Neuro: Alert and oriented to person, place, and time. No focal deficits noted.       LABS   CBC:      Lab Results   Component Value Date    WBC 8.5 07/24/2021    RBC 5.39 07/24/2021    HGB 16.2 07/24/2021    HCT 46.0 07/24/2021    MCV 85.4 07/24/2021    RDW 15.1 07/24/2021     07/24/2021     CMP:  Lab Results   Component Value Date     08/06/2021    K 4.0 08/06/2021    K 4.0 07/24/2021     08/06/2021    CO2 25 08/06/2021    BUN 15 08/06/2021    CREATININE 1.25 08/06/2021    GFRAA >60 07/24/2021    AGRATIO 1.1 07/23/2021    LABGLOM 60 08/06/2021    LABGLOM >60 07/24/2021    GLUCOSE 102 08/06/2021    PROT 8.7 07/23/2021    CALCIUM 10.4 08/06/2021    BILITOT 0.4 07/23/2021    ALKPHOS 73 07/23/2021    AST 15 07/23/2021    ALT 20 07/23/2021     PT/INR:   No results found for: PTINR  Liver:  No components found for: CHLPL  Lab Results   Component Value Date    ALT 20 07/23/2021    AST 15 07/23/2021    ALKPHOS 73 07/23/2021    BILITOT 0.4 07/23/2021     No results found for: LABA1C  Lipids:         Lab Results   Component Value Date    TRIG 169 (H) 07/24/2021    TRIG 179 (H) 07/23/2021    TRIG 250 (H) 07/14/2021            Lab Results   Component Value Date    HDL 22 (L) 07/24/2021    HDL 23 (L) 07/23/2021    HDL 23 (L) 07/14/2021            Lab is small and has a 40% ostial   stenosis and 50% distal stenosis. D. Right coronary artery: Dominant vessel.  The RCA is diffusely   diseased with 95% proximal, 80% mid-vessel, and 80% distal   stenoses.  The RPDA has minor luminal irregularities. Results of Intervention (PCI of proximal to distal RCA):   Pre - 95% stenosis, JASON 2 flow   Post - <10% stenosis, JASON 3 flow     [facility] 7/23/21:  Findings:   1. Hemodynamics:      A. Opening arterial pressure: 151/86 (112) mmHg     2.  Coronary anatomy:   A. Left main artery: The left main artery bifurcates into the   left anterior descending artery and left circumflex artery.  The   left main artery has minor luminal irregularities. B. Left anterior descending artery: Transapical vessel which   gives rise to one diagonal artery.  The LAD has a 40-50% ostial   stenosis, sequential 30% and 40% mid-vessel stenoses, and a 30$   distal stenosis.  The diagonal artery is a small-medium caliber   branching vessel with mild-moderate diffuse disease. C. Left circumflex artery: Non-dominant vessel that gives rise to   2 obtuse marginal arteries.  The proximal LCx has sequential 30%   and 70% stenoses.  The mid-LCx has a 40% stenosis immediately   after the bifurcation of the OM1.  The AV groove continuation is   small and has mild disease.  The OM1 is a large branching vessel   with sequential 80% and 90% proximal stenoses and a 60%   mid-vessel stenosis.  The first branch of the OM1 is small and   has an 80% ostial stenosis followed by a long 50% proximal to   mid-vessel stenosis.  The OM2 is small and has a 40% ostial   stenosis and 50% distal stenosis. D. Right coronary artery: Known to be dominant vessel.  The RCA   was not re-studied. Results of Intervention (PCI of proximal LCx into OM1):   Pre - 90% stenosis, JASON 3 flow   Post - <10% stenosis, JASON 3 flow          Assessment:     1.  CAD - S/p NSTEMI and PCI of proximal to distal RCA with overlapping Resolute hesitant to try Chantix due to concern for a possible false positive urine drug, but will would now like to give it a trial.  7. Discussed importance of heart healthy, low sodium diet and encouraged regular physical exercise for at least 30 minutes a minimum of 3-5 times per week. 8. Follow-up with me in 6 months. Scribe's attestation: This note was scribed in the presence of Dr. Haroldine Gowers, DO  by Seymour Lee LPN      It is a pleasure to assist in the care of Leslie Gutierrez. Please call with any questions. The scribes documentation has been prepared under my direction and personally reviewed by me in its entirety. I confirm that the note above accurately reflects all work, treatment, procedures, and medical decision making performed by me. I, Dr. Haroldine Gowers, personally performed the services described in this documentation as scribed by Seymour Lee RN in my presence, and it is both accurate and complete to the best of our ability.         [facility]  [phone] [facility]  [phone] [address] [place] [address]

## 2021-11-09 ENCOUNTER — OFFICE VISIT (OUTPATIENT)
Dept: CARDIOLOGY CLINIC | Age: 56
End: 2021-11-09
Payer: OTHER GOVERNMENT

## 2021-11-09 VITALS
WEIGHT: 203.5 LBS | HEIGHT: 69 IN | DIASTOLIC BLOOD PRESSURE: 88 MMHG | OXYGEN SATURATION: 98 % | HEART RATE: 95 BPM | SYSTOLIC BLOOD PRESSURE: 146 MMHG | BODY MASS INDEX: 30.14 KG/M2

## 2021-11-09 DIAGNOSIS — Z72.0 TOBACCO USE: ICD-10-CM

## 2021-11-09 DIAGNOSIS — I25.5 ISCHEMIC CARDIOMYOPATHY: ICD-10-CM

## 2021-11-09 DIAGNOSIS — Z98.61 CAD S/P PERCUTANEOUS CORONARY ANGIOPLASTY: ICD-10-CM

## 2021-11-09 DIAGNOSIS — I50.22 CHRONIC SYSTOLIC HEART FAILURE (HCC): ICD-10-CM

## 2021-11-09 DIAGNOSIS — I10 ESSENTIAL HYPERTENSION: ICD-10-CM

## 2021-11-09 DIAGNOSIS — I21.4 NSTEMI (NON-ST ELEVATED MYOCARDIAL INFARCTION) (HCC): ICD-10-CM

## 2021-11-09 DIAGNOSIS — I25.10 CAD S/P PERCUTANEOUS CORONARY ANGIOPLASTY: ICD-10-CM

## 2021-11-09 DIAGNOSIS — N18.9 CHRONIC KIDNEY DISEASE, UNSPECIFIED CKD STAGE: ICD-10-CM

## 2021-11-09 DIAGNOSIS — E78.2 MIXED HYPERLIPIDEMIA: ICD-10-CM

## 2021-11-09 DIAGNOSIS — I77.1 STENOSIS OF LEFT SUBCLAVIAN ARTERY (HCC): ICD-10-CM

## 2021-11-09 DIAGNOSIS — I65.23 BILATERAL CAROTID ARTERY STENOSIS: Primary | ICD-10-CM

## 2021-11-09 PROCEDURE — 99214 OFFICE O/P EST MOD 30 MIN: CPT | Performed by: INTERNAL MEDICINE

## 2021-11-09 RX ORDER — VARENICLINE TARTRATE
KIT
Qty: 1 BOX | Refills: 0 | Status: SHIPPED | OUTPATIENT
Start: 2021-11-09

## 2021-11-09 RX ORDER — LISINOPRIL 20 MG/1
20 TABLET ORAL DAILY
Qty: 30 TABLET | Refills: 5 | Status: SHIPPED | OUTPATIENT
Start: 2021-11-09

## 2021-11-09 NOTE — LETTER
[facility]  1965      [address] [facility]   Cardiovascular Evaluation    PATIENT: [facility]  DATE: 2021  MRN: [id number]  [facility]: [id number]  : 1965      Primary Care Doctor: Julieta Kruger  Reason for evaluation:   Follow-up for CAD    Subjective:     Eldon Keller is a 75-year-old male with a history of CAD, ischemic cardiomyopathy, TIA, PVD, bilateral carotid artery disease s/p right carotid artery stenting and left carotid artery CEA, asymptomatic left subclavian artery stenosis, heterozygous factor V Leiden mutation, essential hypertension, hyperlipidemia, and tobacco use who presents for follow-up. The patient was admitted to [facility] from -21 with an NSTEMI and underwent invasive coronary angiography which demonstrated severe 2-vessel coronary artery disease involving the RCA and proximal LCx/OM1. The LAD had moderate non-obstructive disease. At that time, he underwent PCI of the proximal to distal RCA with overlapping Resolute [place] 2.25 x 30 mm and 2.5 x 38 mm SERGIO, tapered to final diameter of ~3.11 mm. He then returned on 21 for staged PCI of his proximal LCx into the OM1 with overlapping Resolute [place] 2.5 x 38 mm and 2.25 x 8 mm SERGIO, tapered to final diameter of ~3 mm. (Following deployment and post-dilation of the first SERGIO, repeat angiography showed what appeared to be a small edge dissection at the distal stent edge.  Therefore, a second Resolute [place] 2.25 x 8 mm SERGIO was deployed in overlapping fashion to cover the small dissection.)  TTE from 21 showed an LVEF of 40-45% with septal hypokinesis, mild concentric LVH, grade 1 diastolic dysfunction, normal RV size and systolic function, and mild mitral regurgitation. Since his last visit on 21, the patient reports that he has been doing very well from cardiovascular standpoint.   He denies any recent chest pain, shortness of breath, lightheadedness, dizziness, palpitations, lower extremity edema, orthopnea, or paroxysmal nocturnal dyspnea. He has been compliant with his dual antiplatelet therapy and other medications. He is unfortunately still smoking one pack of cigarettes per day. Patient Active Problem List   Diagnosis    Stenosis of left carotid artery    History of right common carotid artery stent placement    Carotid stenosis, left    NSTEM[facility] (non-ST elevated myocardial infarction) ([facility]    Essential hypertension    [facility]    NSVT [facility]nonsustained ventricular tachycardia) [facility]    PVD [facility]peripheral vascular disease) [facility]    [facility]ot[facility] artery disease [facility]    Stenosis of left subclavian artery ([facility]    Tobacco use    Heterozygous factor V Leiden mutation [facility]    CAD in native artery    CAD S/P percutaneous coronary angioplasty         Past Medical History:   has a past medical history of Atherosclerosis, Carotid stenosis, Heterozygous factor V Leiden mutation ([facility] History of TIAs, and Hypertension. Surgical History:   has a past surgical history that includes Carotid endarterectomy (Right); vascular surgery (Bilateral); Appendectomy; and Carotid endarterectomy (Left, 11/19/2020). Social History:   reports that he has been smoking cigarettes. He has a 45.00 pack-year smoking history. He has never used smokeless tobacco. He reports current alcohol use. He reports that he does not use drugs. Family History:  Family History   Problem Relation Age of Onset    Other Mother         Factor V Leiden    Heart Attack Father        Home Medications:  Reviewed and are listed in nursing record.  and/or listed below  Current Outpatient Medications   Medication Sig Dispense Refill    alirocumab (PRALUENT) 75 MG/ML SOAJ injection pen Inject 1 mL into the skin every 14 days 6 pen 3    Evolocumab 140 MG/ML SOAJ Inject 140 mg into the skin every 14 days 10 pen 3    hydrALAZINE (APRESOLINE) 25 MG tablet Take 1 tablet by mouth 3 times daily 270 tablet 3    metoprolol succinate (TOPROL XL) 25 MG extended release tablet Take 1 tablet by mouth daily 30 tablet 3    aspirin 81 MG chewable tablet Take 1 tablet by mouth daily 30 tablet 3    ticagrelor (BRILINTA) 90 MG TABS tablet Take 1 tablet by mouth 2 times daily 60 tablet 11    levothyroxine (SYNTHROID) 150 MCG tablet TAKE ONE TABLET BY MOUTH AT BEDTIME ON AN EMPTY STOMACH FOR THYROID      ezetimibe (ZETIA) 10 MG tablet TAKE ONE TABLET BY MOUTH ONCE DAILY      amLODIPine (NORVASC) 10 MG tablet Take 10 mg by mouth daily       No current facility-administered medications for this visit. Allergies:  Penicillins and Statins     Review of Systems:   Review of Systems   Constitutional: Negative for chills and fever. HENT: Negative for congestion, rhinorrhea and sore throat. Eyes: Negative for photophobia, pain and visual disturbance. Respiratory: Negative for cough and shortness of breath. Cardiovascular: Negative for chest pain, palpitations and leg swelling. Gastrointestinal: Negative for abdominal pain, blood in stool, constipation, diarrhea, nausea and vomiting. Endocrine: Negative for cold intolerance and heat intolerance. Genitourinary: Negative for difficulty urinating, dysuria and hematuria. Musculoskeletal: Negative for arthralgias, joint swelling and myalgias. Skin: Negative for rash and wound. Allergic/Immunologic: Negative for environmental allergies and food allergies. Neurological: Negative for dizziness, syncope and light-headedness. Hematological: Negative for adenopathy. Does not bruise/bleed easily. Psychiatric/Behavioral: Negative for dysphoric mood. The patient is not nervous/anxious.         Objective:   PHYSICAL EXAM:    Vitals:    11/09/21 0908   BP: (!) 140/80 and 146/88 on repeat   Pulse: 95   SpO2: 98% on room air      Weight: 203 lb 8 oz (92.3 kg)     Wt Readings from Last 3 Encounters:   11/09/21 203 lb 8 oz (92.3 kg)   08/05/21 200 lb (90.7 kg) 07/24/21 197 lb 8.5 oz (89.6 kg)     General: Adult male in no acute distress. Pleasant and interactive on exam.  HEENT: Normocephalic, atraumatic, non-icteric, hearing intact, nares normal, mucous membranes moist.  Neck: No JVD. Heart: Regular rate and rhythm. Normal S1 and S2. No murmurs, gallops or rubs. Lungs: Normal respiratory effort. Breath sounds mildly diminished bilaterally. No wheezes, rales, or rhonchi. Abdomen: Soft, non-tender. Normoactive bowel sounds. No masses or organomegaly. Skin: No rashes, wounds, or lesions. Pulses: 2+ right radial artery pulse. Left radial artery pulse is diminished. Extremities: No clubbing, cyanosis, or edema. Psych: Normal mood and affect. Neuro: Alert and oriented to person, place, and time. No focal deficits noted.       LABS   CBC:      Lab Results   Component Value Date    WBC 8.5 07/24/2021    RBC 5.39 07/24/2021    HGB 16.2 07/24/2021    HCT 46.0 07/24/2021    MCV 85.4 07/24/2021    RDW 15.1 07/24/2021     07/24/2021     CMP:  Lab Results   Component Value Date     08/06/2021    K 4.0 08/06/2021    K 4.0 07/24/2021     08/06/2021    CO2 25 08/06/2021    BUN 15 08/06/2021    CREATININE 1.25 08/06/2021    GFRAA >60 07/24/2021    AGRATIO 1.1 07/23/2021    LABGLOM 60 08/06/2021    LABGLOM >60 07/24/2021    GLUCOSE 102 08/06/2021    PROT 8.7 07/23/2021    CALCIUM 10.4 08/06/2021    BILITOT 0.4 07/23/2021    ALKPHOS 73 07/23/2021    AST 15 07/23/2021    ALT 20 07/23/2021     PT/INR:   No results found for: PTINR  Liver:  No components found for: CHLPL  Lab Results   Component Value Date    ALT 20 07/23/2021    AST 15 07/23/2021    ALKPHOS 73 07/23/2021    BILITOT 0.4 07/23/2021     No results found for: LABA1C  Lipids:         Lab Results   Component Value Date    TRIG 169 (H) 07/24/2021    TRIG 179 (H) 07/23/2021    TRIG 250 (H) 07/14/2021            Lab Results   Component Value Date    HDL 22 (L) 07/24/2021    HDL 23 (L) 07/23/2021    HDL 23 (L) 07/14/2021            Lab Results   Component Value Date    LDLCALC 131 (H) 07/24/2021    LDLCALC 138 (H) 07/23/2021    LDLCALC 128 (H) 07/14/2021            Lab Results   Component Value Date    LABVLDL 34 07/24/2021    LABVLDL 36 07/23/2021    LABVLDL 50 07/14/2021         CARDIAC DATA     LAST EKG 7/24/21:  Normal sinus rhythm, inferior T wave abnormality, mildly prolonged QTc.    ECHO:  TTE 7/13/21:  Summary   Technically difficult examination. The left ventricular systolic function is mildly reduced with an ejection   fraction of 40-45%. There is septal hypokinesis. There is mild concentric left ventricular hypertrophy. Grade I diastolic dysfunction with normal left ventricular filling pressure. Mild mitral regurgitation. STRESS TEST: N/A    CATH:  [facility] 7/13/21:  Findings:   1. Hemodynamics:      A. Opening arterial pressure: 130/74 (96) mmHg       B. LVEDP: 9 mmHg     2.  Coronary anatomy:   A. Left main artery: The left main artery bifurcates into the   left anterior descending artery and left circumflex artery.  The   left main artery has minor luminal irregularities. B. Left anterior descending artery: Transapical vessel which   gives rise to one diagonal artery.  The LAD has a 40-50% ostial   stenosis, sequential 30% and 40% mid-vessel stenoses, and a 30$   distal stenosis.  The diagonal artery is a small-medium caliber   branching vessel with mild-moderate diffuse disease.    C. Left circumflex artery: Non-dominant vessel that gives rise to   2 obtuse marginal arteries.  The proximal LCx has sequential 30%   and 70% stenoses.  The mid-LCx has a 40% stenosis immediately   after the bifurcation of the OM1.  The AV groove continuation is   small and has mild disease.  The OM1 is a large branching vessel   with sequential 80% and 90% proximal stenoses and a 60%   mid-vessel stenosis.  The first branch of the OM1 is small and   has an 80% ostial stenosis followed by a long 50% proximal to mid-vessel stenosis.  The OM2 is small and has a 40% ostial   stenosis and 50% distal stenosis. D. Right coronary artery: Dominant vessel.  The RCA is diffusely   diseased with 95% proximal, 80% mid-vessel, and 80% distal   stenoses.  The RPDA has minor luminal irregularities. Results of Intervention (PCI of proximal to distal RCA):   Pre - 95% stenosis, JASON 2 flow   Post - <10% stenosis, JASON 3 flow     [facility] 7/23/21:  Findings:   1. Hemodynamics:      A. Opening arterial pressure: 151/86 (112) mmHg     2.  Coronary anatomy:   A. Left main artery: The left main artery bifurcates into the   left anterior descending artery and left circumflex artery.  The   left main artery has minor luminal irregularities. B. Left anterior descending artery: Transapical vessel which   gives rise to one diagonal artery.  The LAD has a 40-50% ostial   stenosis, sequential 30% and 40% mid-vessel stenoses, and a 30$   distal stenosis.  The diagonal artery is a small-medium caliber   branching vessel with mild-moderate diffuse disease. C. Left circumflex artery: Non-dominant vessel that gives rise to   2 obtuse marginal arteries.  The proximal LCx has sequential 30%   and 70% stenoses.  The mid-LCx has a 40% stenosis immediately   after the bifurcation of the OM1.  The AV groove continuation is   small and has mild disease.  The OM1 is a large branching vessel   with sequential 80% and 90% proximal stenoses and a 60%   mid-vessel stenosis.  The first branch of the OM1 is small and   has an 80% ostial stenosis followed by a long 50% proximal to   mid-vessel stenosis.  The OM2 is small and has a 40% ostial   stenosis and 50% distal stenosis. D. Right coronary artery: Known to be dominant vessel.  The RCA   was not re-studied. Results of Intervention (PCI of proximal LCx into OM1):   Pre - 90% stenosis, JASON 3 flow   Post - <10% stenosis, JASON 3 flow          Assessment:     1.  CAD - S/p NSTEMI and PCI of proximal to distal RCA with overlapping Resolute [place] 2.25 x 30 mm and 2.5 x 38 mm SERGIO, tapered to final diameter of ~3.11 mm on 7/13/21. He then underwent staged PCI of his proximal LCx into the OM1 on 7/23/21 with overlapping   Resolute [place] 2.5 x 38 mm and 2.25 x 8 mm SERGIO, tapered to final diameter of ~3 mm. (Following deployment and post-dilation of the   first SERGIO, repeat angiography showed what appeared to be a small edge dissection at the distal stent edge.  Therefore, a second   Resolute [place] 2.25 x 8 mm SERGIO was deployed in overlapping fashion to cover the small dissection.)  Continues to deny angina. 2. LV systolic heart failure/ischemic cardiomyopathy  - TTE from 7/13/21 showed an LVEF of 40-45% with septal hypokinesis. Remains clinically euvolemic. 3. Bilateral carotid artery disease - S/p right carotid artery stenting and left carotid artery CEA. 4. Essential hypertension - BP mildly elevated today in clinic in 140s/80s. 5. Asymptomatic left subclavian artery stenosis  6. Hyperlipidemia  7. CKD  8. TIA  9. Heterozygous factor V Leiden mutation  10. Tobacco use    Plan:     1. Overall, patient remains stable from a cardiovascular standpoint, denies angina, and remains clinically euvolemic. 2. Recent labs from the [place] were reviewed. Creatinine remains stable and given reduced LVEF, will start lisinopril 20 mg daily and have patient discontinue amlodipine. He was instructed to continue checking his BP 2-3 times daily on his home machine and to notify our clinic if readings are consistently >130/80. 3. Continue dual antiplatelet therapy with aspirin 81 mg daily and Brilinta 90 mg BID. 4. Continue Repatha 140 mg SC q2 weeks, ezetimibe 10 mg daily, metoprolol succinate 25 mg daily, and hydralazine 25 mg TID. 5. Will repeat BMP in 1-2 weeks to monitor creatinine and potassium following initiation of ACEI. 6. Again reviewed risks of continued tobacco use and strongly encouraged smoking cessation.   Patient is a  and was previously hesitant to try Chantix due to concern for a possible false positive urine drug, but will would now like to give it a trial.  7. Discussed importance of heart healthy, low sodium diet and encouraged regular physical exercise for at least 30 minutes a minimum of 3-5 times per week. 8. Follow-up with me in 6 months. Scribe's attestation: This note was scribed in the presence of Dr. Wesley Hicks,   by Blank Garcia LPN      It is a pleasure to assist in the care of Aimna Meeks. Please call with any questions. The scribes documentation has been prepared under my direction and personally reviewed by me in its entirety. I confirm that the note above accurately reflects all work, treatment, procedures, and medical decision making performed by me. I, Dr. Wesley Hicks, personally performed the services described in this documentation as scribed by Blank Garcia RN in my presence, and it is both accurate and complete to the best of our ability.         DO Medina Sanders [address]  [phone] [facility]  [phone] [address] [place] [address]

## 2021-11-09 NOTE — PATIENT INSTRUCTIONS
1. Smoking cessation discussed. Benefits and side effects discussed of Chantix. Will go forward with medication at this time. 2. Will obtain recent lab work from [address] to determine if changes are needed with medications. 3. Continue taking aspirin and brillinta along with all other medications. 4. Avoid fried foods and foods high in sodium. Follow a heart healthy diet, fruits and vegetables.   5. Follow up with me in 6 months

## 2025-07-06 ENCOUNTER — HOSPITAL ENCOUNTER (EMERGENCY)
Age: 60
Discharge: HOME | End: 2025-07-06
Payer: OTHER GOVERNMENT

## 2025-07-06 VITALS
OXYGEN SATURATION: 97 % | HEART RATE: 80 BPM | DIASTOLIC BLOOD PRESSURE: 80 MMHG | SYSTOLIC BLOOD PRESSURE: 150 MMHG | WEIGHT: 177.2 LBS | TEMPERATURE: 98.1 F | BODY MASS INDEX: 26.17 KG/M2 | RESPIRATION RATE: 18 BRPM

## 2025-07-06 DIAGNOSIS — I73.9 PVD (PERIPHERAL VASCULAR DISEASE): ICD-10-CM

## 2025-07-06 DIAGNOSIS — Z72.0 TOBACCO ABUSE: ICD-10-CM

## 2025-07-06 DIAGNOSIS — M79.605 LEFT LEG PAIN: Primary | ICD-10-CM

## 2025-07-06 PROCEDURE — 99283 EMERGENCY DEPT VISIT LOW MDM: CPT

## 2025-07-06 NOTE — ED PROVIDER NOTES
[address] [address] [phone]  Go to   If symptoms worsen      DISCHARGE MEDICATIONS:  Discharge Medication List as of 7/6/2025  1:12 AM                     (Please note thatportions of this note were completed with a voice recognition program.  Efforts were made to edit the dictations, but occasionally words are mis-transcribed.)    SEBASTIAN HANNAH-C (electronicallysigned)              Larry Ramirez PA  07/06/25 0220

## (undated) DEVICE — RESERVOIR,SUCTION,100CC,SILICONE: Brand: MEDLINE

## (undated) DEVICE — COTTON BALLS 5/PK: Brand: CARDINAL HEALTH

## (undated) DEVICE — MAGNETIC INSTRUMENT PAD 10" X 16"; MEDIUM; DISPOSABLE: Brand: CARDINAL HEALTH

## (undated) DEVICE — FOGARTY - HYDRAGRIP SURGICAL - CLAMP INSERTS: Brand: FOGARTY SOFTJAW

## (undated) DEVICE — DRAIN SURG FLAT W7MMXL20CM FULL PERF

## (undated) DEVICE — CATHETER,URETHRAL,REDRUBBER,STERILE,20FR: Brand: MEDLINE

## (undated) DEVICE — SUTURE MCRYL + SZ 4-0 L18IN ABSRB UD L19MM PS-2 3/8 CIR MCP496G

## (undated) DEVICE — SUTURE VCRL + SZ 3-0 L27IN ABSRB UD L26MM SH 1/2 CIR VCP416H

## (undated) DEVICE — SUTURE VCRL + SZ 2-0 L27IN ABSRB CLR CT-1 1/2 CIR TAPERCUT VCP259H

## (undated) DEVICE — 3M™ TEGADERM™ TRANSPARENT FILM DRESSING FRAME STYLE, 1624W, 2-3/8 IN X 2-3/4 IN (6 CM X 7 CM), 100/CT 4CT/CASE: Brand: 3M™ TEGADERM™

## (undated) DEVICE — GOWN SIRUS NONREIN XL W/TWL: Brand: MEDLINE INDUSTRIES, INC.

## (undated) DEVICE — SOLUTION IV IRRIG POUR BRL 0.9% SODIUM CHL 2F7124

## (undated) DEVICE — STAPLER EXT SKIN 35 WIDE S STL STPL SQUEEZE HNDL VISISTAT

## (undated) DEVICE — DISH PETRI ST LF

## (undated) DEVICE — Device: Brand: MEDEX

## (undated) DEVICE — SUTURE NONABSORBABLE MONOFILAMENT 7-0 BV-1 1X24 IN PROLENE 8702H

## (undated) DEVICE — COVER US PRB W12XL244CM SURGICAL INTRAOPERATIVE PLAS TAPR L

## (undated) DEVICE — SUTURE NONABSORBABLE MONOFILAMENT 6-0 C-1 1X30 IN PROLENE 8706H

## (undated) DEVICE — GLOVE SURG SZ 8 L11.77IN FNGR THK9.8MIL STRW LTX POLYMER

## (undated) DEVICE — SURGIFOAM SPNG SZ 12-7

## (undated) DEVICE — NEEDLE HYPO 22GA L1.5IN BLK POLYPR HUB S STL REG BVL STR

## (undated) DEVICE — SUTURE PERMA-HAND SZ 3-0 L18IN NONABSORBABLE BLK RB-1 L17MM C053D

## (undated) DEVICE — GAUZE,SPONGE,2"X2",8PLY,STERILE,LF,2'S: Brand: MEDLINE

## (undated) DEVICE — Device